# Patient Record
Sex: FEMALE | Race: WHITE | Employment: OTHER | URBAN - METROPOLITAN AREA
[De-identification: names, ages, dates, MRNs, and addresses within clinical notes are randomized per-mention and may not be internally consistent; named-entity substitution may affect disease eponyms.]

---

## 2017-12-01 ENCOUNTER — HOSPITAL ENCOUNTER (OUTPATIENT)
Dept: MAMMOGRAPHY | Age: 67
Discharge: HOME | End: 2017-12-01
Attending: INTERNAL MEDICINE
Payer: MEDICARE

## 2017-12-01 ENCOUNTER — HOSPITAL ENCOUNTER (OUTPATIENT)
Dept: BONE DENSITY | Age: 67
Discharge: HOME | End: 2017-12-01
Attending: INTERNAL MEDICINE
Payer: MEDICARE

## 2017-12-01 DIAGNOSIS — M85.9 DISORDER OF BONE DENSITY AND STRUCTURE, UNSPECIFIED: ICD-10-CM

## 2017-12-01 DIAGNOSIS — Z12.31 VISIT FOR SCREENING MAMMOGRAM: ICD-10-CM

## 2017-12-01 PROCEDURE — 77063 BREAST TOMOSYNTHESIS BI: CPT

## 2017-12-01 PROCEDURE — 77080 DXA BONE DENSITY AXIAL: CPT

## 2020-11-02 ENCOUNTER — HOSPITAL ENCOUNTER (OUTPATIENT)
Dept: MAMMOGRAPHY | Age: 70
Discharge: HOME | End: 2020-11-02
Attending: INTERNAL MEDICINE
Payer: MEDICARE

## 2020-11-02 DIAGNOSIS — Z12.31 VISIT FOR SCREENING MAMMOGRAM: ICD-10-CM

## 2020-11-02 PROCEDURE — 77063 BREAST TOMOSYNTHESIS BI: CPT

## 2023-08-18 ENCOUNTER — OFFICE VISIT (OUTPATIENT)
Age: 73
End: 2023-08-18

## 2023-08-18 DIAGNOSIS — M25.512 CHRONIC LEFT SHOULDER PAIN: ICD-10-CM

## 2023-08-18 DIAGNOSIS — S42.242P: Primary | ICD-10-CM

## 2023-08-18 DIAGNOSIS — G89.29 CHRONIC LEFT SHOULDER PAIN: ICD-10-CM

## 2023-08-18 NOTE — PROGRESS NOTES
tablet by mouth daily      diphenhydrAMINE (BENADRYL) 25 MG capsule Take 25 mg by mouth      ergocalciferol (ERGOCALCIFEROL) 1.25 MG (48084 UT) capsule Take 50,000 Units by mouth      ibuprofen (ADVIL;MOTRIN) 800 MG tablet Take 800 mg by mouth 3 times daily as needed      levothyroxine (SYNTHROID) 100 MCG tablet Take 100 mcg by mouth every other day      levothyroxine (SYNTHROID) 112 MCG tablet Take 112 mcg by mouth every other day      omeprazole (PRILOSEC) 20 MG delayed release capsule Take 20 mg by mouth daily      oxyCODONE-acetaminophen (PERCOCET) 5-325 MG per tablet 1-2 tablets every 4-6 hours prn pain      sertraline (ZOLOFT) 50 MG tablet Take 25 mg by mouth      simvastatin (ZOCOR) 20 MG tablet Take 20 mg by mouth       No current facility-administered medications for this visit. Allergies   Allergen Reactions    Cefprozil Hives    Iodinated Contrast Media Itching     Patient not sure if has allergy, patient stated started itching past a CT scan with dye    Shellfish Allergy Itching     Itching past eating shrimp, but eats shrimp sometimes. Past Surgical History:   Procedure Laterality Date    APPENDECTOMY         Social History     Socioeconomic History    Marital status: Legally      Spouse name: Not on file    Number of children: Not on file    Years of education: Not on file    Highest education level: Not on file   Occupational History    Not on file   Tobacco Use    Smoking status: Never    Smokeless tobacco: Not on file   Substance and Sexual Activity    Alcohol use:  Yes     Alcohol/week: 2.5 standard drinks    Drug use: Not on file    Sexual activity: Not on file   Other Topics Concern    Not on file   Social History Narrative    Not on file     Social Determinants of Health     Financial Resource Strain: Not on file   Food Insecurity: Not on file   Transportation Needs: Not on file   Physical Activity: Not on file   Stress: Not on file   Social Connections: Not on file

## 2023-08-31 ENCOUNTER — HOSPITAL ENCOUNTER (OUTPATIENT)
Facility: HOSPITAL | Age: 73
Discharge: HOME | End: 2023-08-31
Attending: ORTHOPAEDIC SURGERY
Payer: MEDICARE

## 2023-08-31 DIAGNOSIS — S42.242P: ICD-10-CM

## 2023-08-31 PROCEDURE — 73200 CT UPPER EXTREMITY W/O DYE: CPT

## 2023-09-26 ENCOUNTER — OFFICE VISIT (OUTPATIENT)
Age: 73
End: 2023-09-26
Payer: MEDICARE

## 2023-09-26 VITALS — WEIGHT: 207.6 LBS | TEMPERATURE: 97.6 F | HEIGHT: 67 IN | BODY MASS INDEX: 32.58 KG/M2

## 2023-09-26 DIAGNOSIS — S42.242P: Primary | ICD-10-CM

## 2023-09-26 PROBLEM — M25.512 PAIN IN JOINT OF LEFT SHOULDER: Status: ACTIVE | Noted: 2023-04-27

## 2023-09-26 PROBLEM — S42.202A CLOSED FRACTURE OF PROXIMAL END OF LEFT HUMERUS: Status: ACTIVE | Noted: 2022-03-18

## 2023-09-26 PROCEDURE — 1123F ACP DISCUSS/DSCN MKR DOCD: CPT | Performed by: ORTHOPAEDIC SURGERY

## 2023-09-26 PROCEDURE — 99214 OFFICE O/P EST MOD 30 MIN: CPT | Performed by: ORTHOPAEDIC SURGERY

## 2023-09-26 PROCEDURE — 1036F TOBACCO NON-USER: CPT | Performed by: ORTHOPAEDIC SURGERY

## 2023-09-26 PROCEDURE — G8427 DOCREV CUR MEDS BY ELIG CLIN: HCPCS | Performed by: ORTHOPAEDIC SURGERY

## 2023-09-26 PROCEDURE — 1090F PRES/ABSN URINE INCON ASSESS: CPT | Performed by: ORTHOPAEDIC SURGERY

## 2023-09-26 PROCEDURE — G8417 CALC BMI ABV UP PARAM F/U: HCPCS | Performed by: ORTHOPAEDIC SURGERY

## 2023-09-26 PROCEDURE — 3017F COLORECTAL CA SCREEN DOC REV: CPT | Performed by: ORTHOPAEDIC SURGERY

## 2023-09-26 PROCEDURE — G8399 PT W/DXA RESULTS DOCUMENT: HCPCS | Performed by: ORTHOPAEDIC SURGERY

## 2023-09-26 NOTE — PROGRESS NOTES
malunited four-part proximal humerus fracture. There appears to be healing between the greater tuberosity and shaft of the humerus. 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 As I discussed with her at her last visit I think the only option for this reasonably is a reverse shoulder arthroplasty to help her pain and function. She would like to move forward with surgery. We discussed the surgery as well as recovery at length and all of her questions were answered. Her risk factors for surgery include previous left shoulder fracture. Prescription medication management discussed with patient. Plan was discussed in detail with patient, all questions were answered, and patient voiced understanding of plan. Electronically signed by: Hank Ivy MD     Note: This note was completed using voice recognition software.   Any typographical/name errors or mistakes are unintentional.

## 2023-10-30 DIAGNOSIS — S42.242P: Primary | ICD-10-CM

## 2023-10-30 DIAGNOSIS — Z01.811 PRE-OP CHEST EXAM: ICD-10-CM

## 2023-10-30 DIAGNOSIS — Z01.818 PREOPERATIVE TESTING: ICD-10-CM

## 2023-10-30 DIAGNOSIS — Z01.810 PREOP CARDIOVASCULAR EXAM: ICD-10-CM

## 2024-06-21 ENCOUNTER — OFFICE VISIT (OUTPATIENT)
Age: 74
End: 2024-06-21

## 2024-06-21 VITALS — RESPIRATION RATE: 16 BRPM | BODY MASS INDEX: 32.51 KG/M2 | HEIGHT: 67 IN

## 2024-06-21 DIAGNOSIS — S42.242P: Primary | ICD-10-CM

## 2024-06-21 NOTE — PROGRESS NOTES
[facility] AMBULATORY OFFICE NOTE    Patient: Deirdre Perez                MRN: [id number]       SSN: [id number]  YOB: 1950        AGE: 73 y.o.        SEX: female  Body mass index is 32.51 kg/m².    PCP: Franco Pugh MD  06/21/24    Chief Complaint: Left shoulder follow-up    HPI: Deirdre Perez is a 73 y.o. female patient who returns today for her left shoulder.  Continues to have 10 pain.  No injury.  She has now been cleared by her primary care doctor to have surgery on her left shoulder.  Previously due to low iron she was not cleared.    Past Medical History:   Diagnosis Date    Acid reflux     Asthma     GERD (gastroesophageal reflux disease)     Hypertension     Hypothyroid     Thyroid disease        Family History   Problem Relation Age of Onset    Hypertension Daughter     Diabetes Sister     Heart Disease Sister     Hypertension Son     Colon Cancer Father     Prostate Cancer Father     Stroke Father     Diabetes Father     Hypertension Mother     Heart Disease Mother     Diabetes Mother     Osteoporosis Mother     Hypertension Father     Heart Disease Father        Current Outpatient Medications   Medication Sig Dispense Refill    amLODIPine-benazepril (LOTREL) 10-20 MG per capsule Take 1 capsule by mouth      bisoprolol-hydroCHLOROthiazide (ZIAC) 10-6.25 MG per tablet Take 1 tablet by mouth daily      carbonyl iron (FEOSOL) 45 MG TABS Take 1 tablet by mouth daily      diphenhydrAMINE (BENADRYL) 25 MG capsule Take 25 mg by mouth      ergocalciferol (ERGOCALCIFEROL) 1.25 MG (42945 UT) capsule Take 50,000 Units by mouth      levothyroxine (SYNTHROID) 100 MCG tablet Take 100 mcg by mouth every other day      levothyroxine (SYNTHROID) 112 MCG tablet Take 112 mcg by mouth every other day      omeprazole (PRILOSEC) 20 MG delayed release capsule Take 20 mg by mouth daily      oxyCODONE-acetaminophen (PERCOCET) 5-325 MG per tablet 1-2 tablets every 4-6 hours

## 2024-07-01 ENCOUNTER — HOSPITAL ENCOUNTER (OUTPATIENT)
Facility: HOSPITAL | Age: 74
Discharge: HOME | End: 2024-07-04
Attending: ORTHOPAEDIC SURGERY
Payer: MEDICARE

## 2024-07-01 DIAGNOSIS — S42.242P: ICD-10-CM

## 2024-07-01 PROCEDURE — 73200 CT UPPER EXTREMITY W/O DYE: CPT

## 2024-07-19 ENCOUNTER — OFFICE VISIT (OUTPATIENT)
Age: 74
End: 2024-07-19
Payer: MEDICARE

## 2024-07-19 VITALS — WEIGHT: 208 LBS | HEIGHT: 67 IN | BODY MASS INDEX: 32.65 KG/M2

## 2024-07-19 DIAGNOSIS — S42.242P: Primary | ICD-10-CM

## 2024-07-19 PROCEDURE — 3017F COLORECTAL CA SCREEN DOC REV: CPT | Performed by: ORTHOPAEDIC SURGERY

## 2024-07-19 PROCEDURE — 1123F ACP DISCUSS/DSCN MKR DOCD: CPT | Performed by: ORTHOPAEDIC SURGERY

## 2024-07-19 PROCEDURE — G8428 CUR MEDS NOT DOCUMENT: HCPCS | Performed by: ORTHOPAEDIC SURGERY

## 2024-07-19 PROCEDURE — 1036F TOBACCO NON-USER: CPT | Performed by: ORTHOPAEDIC SURGERY

## 2024-07-19 PROCEDURE — 1090F PRES/ABSN URINE INCON ASSESS: CPT | Performed by: ORTHOPAEDIC SURGERY

## 2024-07-19 PROCEDURE — G8399 PT W/DXA RESULTS DOCUMENT: HCPCS | Performed by: ORTHOPAEDIC SURGERY

## 2024-07-19 PROCEDURE — G8417 CALC BMI ABV UP PARAM F/U: HCPCS | Performed by: ORTHOPAEDIC SURGERY

## 2024-07-19 PROCEDURE — 99214 OFFICE O/P EST MOD 30 MIN: CPT | Performed by: ORTHOPAEDIC SURGERY

## 2024-07-19 NOTE — PROGRESS NOTES
[facility] AMBULATORY OFFICE NOTE    Patient: Deirdre Perez                MRN: [id number]       SSN: [id number]  YOB: 1950        AGE: 73 y.o.        SEX: female  Body mass index is 32.58 kg/m².    PCP: Franco Pugh MD  07/19/24    Chief Complaint: Left shoulder CT follow up    HPI: Deirdre Perez is a 73 y.o. female patient who returns today for her left shoulder.  No change in pain.    Past Medical History:   Diagnosis Date    Acid reflux     Asthma     GERD (gastroesophageal reflux disease)     Hypertension     Hypothyroid     Thyroid disease        Family History   Problem Relation Age of Onset    Hypertension Daughter     Diabetes Sister     Heart Disease Sister     Hypertension Son     Colon Cancer Father     Prostate Cancer Father     Stroke Father     Diabetes Father     Hypertension Mother     Heart Disease Mother     Diabetes Mother     Osteoporosis Mother     Hypertension Father     Heart Disease Father        Current Outpatient Medications   Medication Sig Dispense Refill    amLODIPine-benazepril (LOTREL) 10-20 MG per capsule Take 1 capsule by mouth      bisoprolol-hydroCHLOROthiazide (ZIAC) 10-6.25 MG per tablet Take 1 tablet by mouth daily      carbonyl iron (FEOSOL) 45 MG TABS Take 1 tablet by mouth daily      diphenhydrAMINE (BENADRYL) 25 MG capsule Take 25 mg by mouth      ergocalciferol (ERGOCALCIFEROL) 1.25 MG (49716 UT) capsule Take 50,000 Units by mouth      levothyroxine (SYNTHROID) 100 MCG tablet Take 100 mcg by mouth every other day      levothyroxine (SYNTHROID) 112 MCG tablet Take 112 mcg by mouth every other day      omeprazole (PRILOSEC) 20 MG delayed release capsule Take 20 mg by mouth daily      oxyCODONE-acetaminophen (PERCOCET) 5-325 MG per tablet 1-2 tablets every 4-6 hours prn pain      sertraline (ZOLOFT) 50 MG tablet Take 25 mg by mouth      simvastatin (ZOCOR) 20 MG tablet Take 20 mg by mouth       No current

## 2024-07-24 DIAGNOSIS — D68.9 COAGULOPATHY (HCC): ICD-10-CM

## 2024-07-24 DIAGNOSIS — Z01.818 PREOPERATIVE TESTING: ICD-10-CM

## 2024-07-24 DIAGNOSIS — Z01.811 PRE-OP CHEST EXAM: ICD-10-CM

## 2024-07-24 DIAGNOSIS — Z01.810 PREOP CARDIOVASCULAR EXAM: ICD-10-CM

## 2024-07-24 DIAGNOSIS — E11.9 TYPE 2 DIABETES MELLITUS WITHOUT COMPLICATION, UNSPECIFIED WHETHER LONG TERM INSULIN USE (HCC): ICD-10-CM

## 2024-07-24 DIAGNOSIS — S42.242P: Primary | ICD-10-CM

## 2024-07-29 ENCOUNTER — PREP FOR PROCEDURE (OUTPATIENT)
Age: 74
End: 2024-07-29

## 2024-07-29 DIAGNOSIS — S42.242P: ICD-10-CM

## 2024-08-01 ENCOUNTER — HOSPITAL ENCOUNTER (OUTPATIENT)
Facility: HOSPITAL | Age: 74
End: 2024-08-01
Payer: MEDICARE

## 2024-08-01 ENCOUNTER — HOSPITAL ENCOUNTER (OUTPATIENT)
Facility: HOSPITAL | Age: 74
Discharge: HOME | End: 2024-08-01
Payer: MEDICARE

## 2024-08-01 DIAGNOSIS — S42.242P: ICD-10-CM

## 2024-08-01 DIAGNOSIS — Z01.810 PREOP CARDIOVASCULAR EXAM: ICD-10-CM

## 2024-08-01 DIAGNOSIS — Z01.811 PRE-OP CHEST EXAM: ICD-10-CM

## 2024-08-01 DIAGNOSIS — D68.9 COAGULOPATHY (HCC): ICD-10-CM

## 2024-08-01 DIAGNOSIS — Z01.818 PREOPERATIVE TESTING: ICD-10-CM

## 2024-08-01 DIAGNOSIS — E11.9 TYPE 2 DIABETES MELLITUS WITHOUT COMPLICATION, UNSPECIFIED WHETHER LONG TERM INSULIN USE (HCC): ICD-10-CM

## 2024-08-01 LAB
ALBUMIN SERPL-MCNC: 3.8 G/DL (ref 3.4–5)
ALBUMIN/GLOB SERPL: 1.1 (ref 0.8–1.7)
ALP SERPL-CCNC: 91 U/L (ref 45–117)
ALT SERPL-CCNC: 22 U/L (ref 13–56)
ANION GAP SERPL CALC-SCNC: 6 MMOL/L (ref 3–18)
APPEARANCE UR: CLEAR
APTT PPP: 28.1 SEC (ref 23–36.4)
AST SERPL-CCNC: 15 U/L (ref 10–38)
BACTERIA URNS QL MICRO: ABNORMAL /HPF
BASOPHILS # BLD: 0 K/UL (ref 0–0.1)
BASOPHILS NFR BLD: 0 % (ref 0–2)
BILIRUB SERPL-MCNC: 0.4 MG/DL (ref 0.2–1)
BILIRUB UR QL: NEGATIVE
BUN SERPL-MCNC: 11 MG/DL (ref 7–18)
BUN/CREAT SERPL: 13 (ref 12–20)
CALCIUM SERPL-MCNC: 8.8 MG/DL (ref 8.5–10.1)
CHLORIDE SERPL-SCNC: 106 MMOL/L (ref 100–111)
CO2 SERPL-SCNC: 27 MMOL/L (ref 21–32)
COLOR UR: ABNORMAL
CREAT SERPL-MCNC: 0.83 MG/DL (ref 0.6–1.3)
DIFFERENTIAL METHOD BLD: ABNORMAL
EOSINOPHIL # BLD: 0.1 K/UL (ref 0–0.4)
EOSINOPHIL NFR BLD: 2 % (ref 0–5)
EPITH CASTS URNS QL MICRO: ABNORMAL /LPF (ref 0–5)
ERYTHROCYTE [DISTWIDTH] IN BLOOD BY AUTOMATED COUNT: 15.8 % (ref 11.6–14.5)
EST. AVERAGE GLUCOSE BLD GHB EST-MCNC: 134 MG/DL
GLOBULIN SER CALC-MCNC: 3.4 G/DL (ref 2–4)
GLUCOSE SERPL-MCNC: 112 MG/DL (ref 74–99)
GLUCOSE UR STRIP.AUTO-MCNC: NEGATIVE MG/DL
HBA1C MFR BLD: 6.3 % (ref 4.2–5.6)
HCT VFR BLD AUTO: 36.2 % (ref 35–45)
HGB BLD-MCNC: 11.8 G/DL (ref 12–16)
HGB UR QL STRIP: NEGATIVE
IMM GRANULOCYTES # BLD AUTO: 0 K/UL (ref 0–0.04)
IMM GRANULOCYTES NFR BLD AUTO: 0 % (ref 0–0.5)
INR PPP: 1 (ref 0.9–1.1)
KETONES UR QL STRIP.AUTO: ABNORMAL MG/DL
LEUKOCYTE ESTERASE UR QL STRIP.AUTO: NEGATIVE
LYMPHOCYTES # BLD: 1.6 K/UL (ref 0.9–3.6)
LYMPHOCYTES NFR BLD: 35 % (ref 21–52)
MCH RBC QN AUTO: 28.7 PG (ref 24–34)
MCHC RBC AUTO-ENTMCNC: 32.6 G/DL (ref 31–37)
MCV RBC AUTO: 88.1 FL (ref 78–100)
MONOCYTES # BLD: 0.4 K/UL (ref 0.05–1.2)
MONOCYTES NFR BLD: 8 % (ref 3–10)
MUCOUS THREADS URNS QL MICRO: ABNORMAL /LPF
NEUTS SEG # BLD: 2.5 K/UL (ref 1.8–8)
NEUTS SEG NFR BLD: 55 % (ref 40–73)
NITRITE UR QL STRIP.AUTO: NEGATIVE
NRBC # BLD: 0 K/UL (ref 0–0.01)
NRBC BLD-RTO: 0 PER 100 WBC
PH UR STRIP: 6.5 (ref 5–8)
PLATELET # BLD AUTO: 219 K/UL (ref 135–420)
PMV BLD AUTO: 9.9 FL (ref 9.2–11.8)
POTASSIUM SERPL-SCNC: 3.8 MMOL/L (ref 3.5–5.5)
PROT SERPL-MCNC: 7.2 G/DL (ref 6.4–8.2)
PROT UR STRIP-MCNC: ABNORMAL MG/DL
PROTHROMBIN TIME: 13.3 SEC (ref 11.9–14.9)
RBC # BLD AUTO: 4.11 M/UL (ref 4.2–5.3)
RBC #/AREA URNS HPF: ABNORMAL /HPF (ref 0–5)
SODIUM SERPL-SCNC: 139 MMOL/L (ref 136–145)
SP GR UR REFRACTOMETRY: 1.02 (ref 1–1.03)
UROBILINOGEN UR QL STRIP.AUTO: 1 EU/DL (ref 0.2–1)
WBC # BLD AUTO: 4.5 K/UL (ref 4.6–13.2)
WBC URNS QL MICRO: ABNORMAL /HPF (ref 0–4)

## 2024-08-01 PROCEDURE — 93005 ELECTROCARDIOGRAM TRACING: CPT

## 2024-08-01 PROCEDURE — 85610 PROTHROMBIN TIME: CPT

## 2024-08-01 PROCEDURE — 85730 THROMBOPLASTIN TIME PARTIAL: CPT

## 2024-08-01 PROCEDURE — 81001 URINALYSIS AUTO W/SCOPE: CPT

## 2024-08-01 PROCEDURE — 36415 COLL VENOUS BLD VENIPUNCTURE: CPT

## 2024-08-01 PROCEDURE — 83036 HEMOGLOBIN GLYCOSYLATED A1C: CPT

## 2024-08-01 PROCEDURE — 80053 COMPREHEN METABOLIC PANEL: CPT

## 2024-08-01 PROCEDURE — 85025 COMPLETE CBC W/AUTO DIFF WBC: CPT

## 2024-08-01 PROCEDURE — 71046 X-RAY EXAM CHEST 2 VIEWS: CPT

## 2024-08-02 LAB
EKG ATRIAL RATE: 73 BPM
EKG DIAGNOSIS: NORMAL
EKG P AXIS: 23 DEGREES
EKG P-R INTERVAL: 166 MS
EKG Q-T INTERVAL: 402 MS
EKG QRS DURATION: 94 MS
EKG QTC CALCULATION (BAZETT): 442 MS
EKG R AXIS: 15 DEGREES
EKG T AXIS: 7 DEGREES
EKG VENTRICULAR RATE: 73 BPM

## 2024-08-02 PROCEDURE — 93010 ELECTROCARDIOGRAM REPORT: CPT | Performed by: INTERNAL MEDICINE

## 2024-08-16 NOTE — PERIOP NOTE
Instructions for your surgery at [facility]      Today's Date:  8/16/2024      Patient's Name:  Deirdre Perez           Surgery Date:  8/26              Please enter the main entrance of the hospital and check-in at the front security desk located in the lobby. They will direct you to the area to report for your surgery.     Do NOT eat or drink anything, including candy, gum, or ice chips after midnight prior to your surgery, unless you have specific instructions from your surgeon or anesthesia provider to do so.  Brush your teeth before coming to the hospital. You may swish with water, but do not swallow.  No smoking/Vaping/E-Cigarettes 24 hours prior to the day of surgery.  No alcohol 24 hours prior to the day of surgery.  No recreational drugs for one week prior to the day of surgery.  Bring Photo ID, Insurance information, and Co-pay if required on day of surgery.  Bring in pertinent legal documents, such as, Medical Power of , DNR, Advance Directive, etc.  Leave all valuables, including money/purse, at home.  Remove all jewelry, including ALL body piercings, nail polish, acrylic nails, and makeup (including mascara); no lotions, powders, deodorant, or perfume/cologne/after shave on the skin.  Follow instruction for Hibiclens washes and CHG wipes from surgeon's office.   Glasses and dentures may be worn to the hospital. They must be removed prior to surgery. Please bring case/container for glasses or dentures.   Contact lenses should not be worn on day of surgery.   Call your doctor's office if symptoms of a cold or illness develop within 24-48 hours prior to your surgery.  Call your doctor's office if you have any questions concerning insurance or co-pays.  15. AN ADULT (relative or friend 18 years or older) MUST DRIVE YOU HOME AFTER YOUR SURGERY.  16. Please make arrangements for a responsible adult (18 years or older) to be with you for 24 hours after your surgery.   17.

## 2024-08-22 RX ORDER — SODIUM CHLORIDE 0.9 % (FLUSH) 0.9 %
5-40 SYRINGE (ML) INJECTION PRN
Status: CANCELLED | OUTPATIENT
Start: 2024-08-22

## 2024-08-22 RX ORDER — SODIUM CHLORIDE 0.9 % (FLUSH) 0.9 %
5-40 SYRINGE (ML) INJECTION EVERY 12 HOURS SCHEDULED
Status: CANCELLED | OUTPATIENT
Start: 2024-08-22

## 2024-08-22 RX ORDER — ACETAMINOPHEN 325 MG/1
1000 TABLET ORAL ONCE
Status: CANCELLED | OUTPATIENT
Start: 2024-08-22 | End: 2024-08-22

## 2024-08-22 RX ORDER — CELECOXIB 100 MG/1
100 CAPSULE ORAL ONCE
Status: CANCELLED | OUTPATIENT
Start: 2024-08-22 | End: 2024-08-22

## 2024-08-22 RX ORDER — SODIUM CHLORIDE 9 MG/ML
INJECTION, SOLUTION INTRAVENOUS PRN
Status: CANCELLED | OUTPATIENT
Start: 2024-08-22

## 2024-08-23 ENCOUNTER — ANESTHESIA EVENT (OUTPATIENT)
Facility: HOSPITAL | Age: 74
End: 2024-08-23
Payer: MEDICARE

## 2024-08-23 ENCOUNTER — TELEPHONE (OUTPATIENT)
Facility: HOSPITAL | Age: 74
End: 2024-08-23

## 2024-08-23 ENCOUNTER — OFFICE VISIT (OUTPATIENT)
Age: 74
End: 2024-08-23

## 2024-08-23 VITALS
WEIGHT: 201.6 LBS | SYSTOLIC BLOOD PRESSURE: 149 MMHG | BODY MASS INDEX: 34.42 KG/M2 | DIASTOLIC BLOOD PRESSURE: 71 MMHG | HEIGHT: 64 IN

## 2024-08-23 DIAGNOSIS — S42.242P: Primary | ICD-10-CM

## 2024-08-23 PROCEDURE — 99024 POSTOP FOLLOW-UP VISIT: CPT | Performed by: ORTHOPAEDIC SURGERY

## 2024-08-23 RX ORDER — OXYCODONE HYDROCHLORIDE AND ACETAMINOPHEN 5; 325 MG/1; MG/1
1-2 TABLET ORAL EVERY 4 HOURS PRN
Qty: 32 TABLET | Refills: 0 | Status: SHIPPED | OUTPATIENT
Start: 2024-08-23 | End: 2024-08-30

## 2024-08-23 NOTE — PROGRESS NOTES
and external rotation with crepitus.    IMAGING:  Imaging read by myself and interpreted as follows:      ASSESSMENT & PLAN  Diagnosis: Left shoulder malunited proximal humerus fracture    Deirdre Perez has a malunited left proximal humerus fracture with collapse.  Plan is for a reverse shoulder replacement on Monday.  All of her questions were answered.  The surgery was discussed in detail.  Pain medication prescribed.  Follow-up postoperatively.    Prescription medication management discussed.     Plan was discussed in detail with patient, all questions were answered, and patient voiced understanding of plan.    Electronically signed by: Alverto Qureshi MD    Note: This note was completed using voice recognition software.  Any typographical/name errors or mistakes are unintentional.

## 2024-08-23 NOTE — TELEPHONE ENCOUNTER
Call placed to patient, ID verified x 2. Patient  has decided with their surgeon to have a total shoulder  replacement to decrease  pain and improve mobility . Topics discussed included surgery preparation, what to expect the day of surgery, medications,  and occupational therapy, and discharge planning.  It was discussed that this is considered an elective surgery and that prior to the surgery  decisions such as arranging for help at home once they are discharged needs to be made.Patient agreed to get home ready for surgery and to have a ride arranged to go home.She identifies her son as her support system and anticipates spending her first night in the hospital. She will be staying with her son after surgery who lives in a one story home with no steps to enter. She is picking up her postoperative medications today.  Instructions were given for CHG bathing. Patient states that she will shower with dial soap.  Patient will complete the procedure the morning of surgery.  Patient was reminded not to apply any deoderant, perfumes, makeup or lotions to skin the morning of surgery. She will remain NPO after midnight and  will take only the medications as instructed to take by her surgeon the morning of surgery with a sip of water. Patient states that she was instructed to take her bisoprolol and her omeprazole the morning of surgery. In addition, she was instructed that she may take her levothyroxine with just a sip of water. A total shoulder replacement   education book will be provided to patient post op prior to discharge. Education regarding the importance of early and frequent ambulation to avoid surgical complications  was provided. Recommended the use of ice to assist with pain and swelling post op for 20 minutes an hour, not to be placed directly on her skin. Reviewed proper sling wear as well as postoperative showering instructions. Patient verbalized understanding of all information provided.  Opportunity was

## 2024-08-23 NOTE — H&P (VIEW-ONLY)
[facility] AMBULATORY OFFICE  HISTORY & PHYSICAL EXAM    Patient: Deirdre Perez                MRN: [id number]       SSN: [id number]  YOB: 1950        AGE: 74 y.o.        SEX: female  Body mass index is 34.6 kg/m².    PCP: Franco Pugh MD  08/23/24    CHIEF COMPLAINT: Left shoulder preop appointment    HPI: Deirdre Perez is a 74 y.o. female patient who is here today for her left shoulder preop for upcoming left reverse shoulder replacement.  No changes since her last visit.    Past Medical History:   Diagnosis Date    Acid reflux     Asthma     GERD (gastroesophageal reflux disease)     Hypertension     Hypothyroid     Thyroid disease        Family History   Problem Relation Age of Onset    Hypertension Daughter     Diabetes Sister     Heart Disease Sister     Hypertension Son     Colon Cancer Father     Prostate Cancer Father     Stroke Father     Diabetes Father     Hypertension Mother     Heart Disease Mother     Diabetes Mother     Osteoporosis Mother     Hypertension Father     Heart Disease Father        Current Outpatient Medications   Medication Sig Dispense Refill    amLODIPine-benazepril (LOTREL) 10-20 MG per capsule Take 1 capsule by mouth      bisoprolol-hydroCHLOROthiazide (ZIAC) 10-6.25 MG per tablet Take 1 tablet by mouth daily      carbonyl iron (FEOSOL) 45 MG TABS Take 1 tablet by mouth daily (Patient not taking: Reported on 8/16/2024)      diphenhydrAMINE (BENADRYL) 25 MG capsule Take 25 mg by mouth      ergocalciferol (ERGOCALCIFEROL) 1.25 MG (10910 UT) capsule Take 50,000 Units by mouth      levothyroxine (SYNTHROID) 100 MCG tablet Take 100 mcg by mouth every other day      levothyroxine (SYNTHROID) 112 MCG tablet Take 112 mcg by mouth every other day      omeprazole (PRILOSEC) 20 MG delayed release capsule Take 20 mg by mouth daily      oxyCODONE-acetaminophen (PERCOCET) 5-325 MG per tablet 1-2 tablets every 4-6 hours prn pain       sertraline (ZOLOFT) 50 MG tablet Take 25 mg by mouth      simvastatin (ZOCOR) 20 MG tablet Take 20 mg by mouth       No current facility-administered medications for this visit.       Allergies   Allergen Reactions    Cefprozil Hives    Iodinated Contrast Media Itching     Patient not sure if has allergy, patient stated started itching past a CT scan with dye    Shellfish Allergy Itching     Itching past eating shrimp, but eats shrimp sometimes.       Past Surgical History:   Procedure Laterality Date    APPENDECTOMY         Social History     Socioeconomic History    Marital status:      Spouse name: Not on file    Number of children: Not on file    Years of education: Not on file    Highest education level: Not on file   Occupational History    Not on file   Tobacco Use    Smoking status: Never    Smokeless tobacco: Not on file   Substance and Sexual Activity    Alcohol use: Yes     Alcohol/week: 2.5 standard drinks of alcohol    Drug use: Not on file    Sexual activity: Not on file   Other Topics Concern    Not on file   Social History Narrative    Not on file     Social Determinants of Health     Financial Resource Strain: Not on file   Food Insecurity: Not on file   Transportation Needs: Not on file   Physical Activity: Not on file   Stress: Not on file   Social Connections: Not on file   Intimate Partner Violence: Not on file   Housing Stability: Not on file       REVIEW OF SYSTEMS:    14 point review of systems on the intake form is negative except as noted in the HPI    PHYSICAL EXAMINATION:  BP (!) 149/71 (Site: Right Upper Arm, Position: Sitting, Cuff Size: Large Adult)   Ht 1.626 m (5' 4\")   Wt 91.4 kg (201 lb 9.6 oz)   LMP  (LMP Unknown)   BMI 34.60 kg/m²   Body mass index is 34.6 kg/m².    GENERAL: Alert and oriented x3, in no acute distress, well-developed, well-nourished.  HEENT: Normocephalic, atraumatic.    Left shoulder continues to have decreased range of motion with forward flexion

## 2024-08-26 ENCOUNTER — ANESTHESIA (OUTPATIENT)
Facility: HOSPITAL | Age: 74
End: 2024-08-26
Payer: MEDICARE

## 2024-08-26 ENCOUNTER — APPOINTMENT (OUTPATIENT)
Facility: HOSPITAL | Age: 74
End: 2024-08-26
Attending: ORTHOPAEDIC SURGERY
Payer: MEDICARE

## 2024-08-26 ENCOUNTER — HOSPITAL ENCOUNTER (OUTPATIENT)
Facility: HOSPITAL | Age: 74
Setting detail: OBSERVATION
Discharge: HOME | End: 2024-08-27
Attending: ORTHOPAEDIC SURGERY | Admitting: ORTHOPAEDIC SURGERY
Payer: MEDICARE

## 2024-08-26 PROBLEM — Z96.612 S/P SHOULDER REPLACEMENT, LEFT: Status: ACTIVE | Noted: 2024-08-26

## 2024-08-26 PROCEDURE — 6370000000 HC RX 637 (ALT 250 FOR IP): Performed by: ORTHOPAEDIC SURGERY

## 2024-08-26 PROCEDURE — 6370000000 HC RX 637 (ALT 250 FOR IP): Performed by: NURSE ANESTHETIST, CERTIFIED REGISTERED

## 2024-08-26 PROCEDURE — 6360000002 HC RX W HCPCS: Performed by: ORTHOPAEDIC SURGERY

## 2024-08-26 PROCEDURE — 2700000000 HC OXYGEN THERAPY PER DAY

## 2024-08-26 PROCEDURE — 94761 N-INVAS EAR/PLS OXIMETRY MLT: CPT

## 2024-08-26 PROCEDURE — 2720000010 HC SURG SUPPLY STERILE: Performed by: ORTHOPAEDIC SURGERY

## 2024-08-26 PROCEDURE — 73020 X-RAY EXAM OF SHOULDER: CPT

## 2024-08-26 PROCEDURE — 6360000002 HC RX W HCPCS: Performed by: NURSE ANESTHETIST, CERTIFIED REGISTERED

## 2024-08-26 PROCEDURE — G0378 HOSPITAL OBSERVATION PER HR: HCPCS

## 2024-08-26 PROCEDURE — 3600000002 HC SURGERY LEVEL 2 BASE: Performed by: ORTHOPAEDIC SURGERY

## 2024-08-26 PROCEDURE — C1713 ANCHOR/SCREW BN/BN,TIS/BN: HCPCS | Performed by: ORTHOPAEDIC SURGERY

## 2024-08-26 PROCEDURE — 3600000012 HC SURGERY LEVEL 2 ADDTL 15MIN: Performed by: ORTHOPAEDIC SURGERY

## 2024-08-26 PROCEDURE — C1776 JOINT DEVICE (IMPLANTABLE): HCPCS | Performed by: ORTHOPAEDIC SURGERY

## 2024-08-26 PROCEDURE — 7100000000 HC PACU RECOVERY - FIRST 15 MIN: Performed by: ORTHOPAEDIC SURGERY

## 2024-08-26 PROCEDURE — 2500000003 HC RX 250 WO HCPCS: Performed by: NURSE ANESTHETIST, CERTIFIED REGISTERED

## 2024-08-26 PROCEDURE — 2580000003 HC RX 258: Performed by: ORTHOPAEDIC SURGERY

## 2024-08-26 PROCEDURE — 97116 GAIT TRAINING THERAPY: CPT

## 2024-08-26 PROCEDURE — 7100000001 HC PACU RECOVERY - ADDTL 15 MIN: Performed by: ORTHOPAEDIC SURGERY

## 2024-08-26 PROCEDURE — 3700000000 HC ANESTHESIA ATTENDED CARE: Performed by: ORTHOPAEDIC SURGERY

## 2024-08-26 PROCEDURE — 2580000003 HC RX 258: Performed by: NURSE ANESTHETIST, CERTIFIED REGISTERED

## 2024-08-26 PROCEDURE — A4217 STERILE WATER/SALINE, 500 ML: HCPCS | Performed by: ORTHOPAEDIC SURGERY

## 2024-08-26 PROCEDURE — 2709999900 HC NON-CHARGEABLE SUPPLY: Performed by: ORTHOPAEDIC SURGERY

## 2024-08-26 PROCEDURE — 23472 RECONSTRUCT SHOULDER JOINT: CPT | Performed by: ORTHOPAEDIC SURGERY

## 2024-08-26 PROCEDURE — 3700000001 HC ADD 15 MINUTES (ANESTHESIA): Performed by: ORTHOPAEDIC SURGERY

## 2024-08-26 PROCEDURE — 97161 PT EVAL LOW COMPLEX 20 MIN: CPT

## 2024-08-26 PROCEDURE — 64415 NJX AA&/STRD BRCH PLXS IMG: CPT | Performed by: STUDENT IN AN ORGANIZED HEALTH CARE EDUCATION/TRAINING PROGRAM

## 2024-08-26 DEVICE — IMPLANTABLE DEVICE: Type: IMPLANTABLE DEVICE | Site: SHOULDER | Status: FUNCTIONAL

## 2024-08-26 DEVICE — SCREW BONE L16MM DIA4.5MM PERIPH NONLOCKING FOR MOD GLEN SYS: Type: IMPLANTABLE DEVICE | Site: SHOULDER | Status: FUNCTIONAL

## 2024-08-26 DEVICE — SCREW BNE L28MM DIA5.5MM PERIPH LOK FOR MOD GLEN SYS: Type: IMPLANTABLE DEVICE | Site: SHOULDER | Status: FUNCTIONAL

## 2024-08-26 DEVICE — SCREW BNE L32MM DIA5.5MM PERIPH LOK FOR MOD GLEN SYS: Type: IMPLANTABLE DEVICE | Site: SHOULDER | Status: FUNCTIONAL

## 2024-08-26 DEVICE — POST MODULAR F/AUNMENTED MGS BASEPLATE: Type: IMPLANTABLE DEVICE | Site: SHOULDER | Status: FUNCTIONAL

## 2024-08-26 DEVICE — SCREW BNE L20MM DIA45MM PERIPH NONLOCKING FOR MOD GLEN SYS: Type: IMPLANTABLE DEVICE | Site: SHOULDER | Status: FUNCTIONAL

## 2024-08-26 DEVICE — STEM HUM SZ 5 SHLDR COAT CAP UNIVERS REVERS: Type: IMPLANTABLE DEVICE | Site: SHOULDER | Status: FUNCTIONAL

## 2024-08-26 DEVICE — SPHERE GLEN DIA33MM +4 BASEPLT 24MM SHLDR LAT TAPR MOD: Type: IMPLANTABLE DEVICE | Site: SHOULDER | Status: FUNCTIONAL

## 2024-08-26 RX ORDER — HYDROCHLOROTHIAZIDE 25 MG/1
6.25 TABLET ORAL DAILY
Status: DISCONTINUED | OUTPATIENT
Start: 2024-08-26 | End: 2024-08-27 | Stop reason: HOSPADM

## 2024-08-26 RX ORDER — SODIUM CHLORIDE 0.9 % (FLUSH) 0.9 %
5-40 SYRINGE (ML) INJECTION PRN
Status: DISCONTINUED | OUTPATIENT
Start: 2024-08-26 | End: 2024-08-27 | Stop reason: HOSPADM

## 2024-08-26 RX ORDER — METOPROLOL SUCCINATE 50 MG/1
100 TABLET, EXTENDED RELEASE ORAL DAILY
Status: DISCONTINUED | OUTPATIENT
Start: 2024-08-26 | End: 2024-08-27 | Stop reason: HOSPADM

## 2024-08-26 RX ORDER — SODIUM CHLORIDE 9 MG/ML
INJECTION, SOLUTION INTRAVENOUS PRN
Status: DISCONTINUED | OUTPATIENT
Start: 2024-08-26 | End: 2024-08-27 | Stop reason: HOSPADM

## 2024-08-26 RX ORDER — FENTANYL CITRATE 50 UG/ML
25 INJECTION, SOLUTION INTRAMUSCULAR; INTRAVENOUS EVERY 5 MIN PRN
Status: DISCONTINUED | OUTPATIENT
Start: 2024-08-26 | End: 2024-08-26 | Stop reason: HOSPADM

## 2024-08-26 RX ORDER — AMLODIPINE BESYLATE 10 MG/1
10 TABLET ORAL DAILY
Status: DISCONTINUED | OUTPATIENT
Start: 2024-08-26 | End: 2024-08-27 | Stop reason: HOSPADM

## 2024-08-26 RX ORDER — SODIUM CHLORIDE 0.9 % (FLUSH) 0.9 %
5-40 SYRINGE (ML) INJECTION PRN
Status: DISCONTINUED | OUTPATIENT
Start: 2024-08-26 | End: 2024-08-26 | Stop reason: HOSPADM

## 2024-08-26 RX ORDER — ONDANSETRON 2 MG/ML
INJECTION INTRAMUSCULAR; INTRAVENOUS PRN
Status: DISCONTINUED | OUTPATIENT
Start: 2024-08-26 | End: 2024-08-26 | Stop reason: SDUPTHER

## 2024-08-26 RX ORDER — ACETAMINOPHEN 325 MG/1
650 TABLET ORAL EVERY 6 HOURS
Status: DISCONTINUED | OUTPATIENT
Start: 2024-08-26 | End: 2024-08-27 | Stop reason: HOSPADM

## 2024-08-26 RX ORDER — ROCURONIUM BROMIDE 10 MG/ML
INJECTION, SOLUTION INTRAVENOUS PRN
Status: DISCONTINUED | OUTPATIENT
Start: 2024-08-26 | End: 2024-08-26 | Stop reason: SDUPTHER

## 2024-08-26 RX ORDER — FENTANYL CITRATE 50 UG/ML
100 INJECTION, SOLUTION INTRAMUSCULAR; INTRAVENOUS
Status: COMPLETED | OUTPATIENT
Start: 2024-08-26 | End: 2024-08-26

## 2024-08-26 RX ORDER — ONDANSETRON 4 MG/1
4 TABLET, ORALLY DISINTEGRATING ORAL EVERY 8 HOURS PRN
Status: DISCONTINUED | OUTPATIENT
Start: 2024-08-26 | End: 2024-08-27 | Stop reason: HOSPADM

## 2024-08-26 RX ORDER — OXYCODONE HYDROCHLORIDE 10 MG/1
10 TABLET ORAL EVERY 4 HOURS PRN
Status: DISCONTINUED | OUTPATIENT
Start: 2024-08-26 | End: 2024-08-27 | Stop reason: HOSPADM

## 2024-08-26 RX ORDER — ATORVASTATIN CALCIUM 10 MG/1
10 TABLET, FILM COATED ORAL DAILY
Status: DISCONTINUED | OUTPATIENT
Start: 2024-08-26 | End: 2024-08-26

## 2024-08-26 RX ORDER — LEVOTHYROXINE SODIUM 50 UG/1
100 TABLET ORAL EVERY OTHER DAY
Status: DISCONTINUED | OUTPATIENT
Start: 2024-08-28 | End: 2024-08-27 | Stop reason: HOSPADM

## 2024-08-26 RX ORDER — SODIUM CHLORIDE 0.9 % (FLUSH) 0.9 %
5-40 SYRINGE (ML) INJECTION EVERY 12 HOURS SCHEDULED
Status: DISCONTINUED | OUTPATIENT
Start: 2024-08-26 | End: 2024-08-26 | Stop reason: HOSPADM

## 2024-08-26 RX ORDER — KETOROLAC TROMETHAMINE 15 MG/ML
INJECTION, SOLUTION INTRAMUSCULAR; INTRAVENOUS PRN
Status: DISCONTINUED | OUTPATIENT
Start: 2024-08-26 | End: 2024-08-26 | Stop reason: SDUPTHER

## 2024-08-26 RX ORDER — PROCHLORPERAZINE EDISYLATE 5 MG/ML
5 INJECTION INTRAMUSCULAR; INTRAVENOUS
Status: DISCONTINUED | OUTPATIENT
Start: 2024-08-26 | End: 2024-08-26 | Stop reason: HOSPADM

## 2024-08-26 RX ORDER — ASPIRIN 325 MG
325 TABLET, DELAYED RELEASE (ENTERIC COATED) ORAL 2 TIMES DAILY
Status: DISCONTINUED | OUTPATIENT
Start: 2024-08-27 | End: 2024-08-27 | Stop reason: HOSPADM

## 2024-08-26 RX ORDER — FERROUS SULFATE 325(65) MG
325 TABLET ORAL
Status: DISCONTINUED | OUTPATIENT
Start: 2024-08-26 | End: 2024-08-27 | Stop reason: HOSPADM

## 2024-08-26 RX ORDER — SODIUM CHLORIDE, SODIUM LACTATE, POTASSIUM CHLORIDE, CALCIUM CHLORIDE 600; 310; 30; 20 MG/100ML; MG/100ML; MG/100ML; MG/100ML
INJECTION, SOLUTION INTRAVENOUS CONTINUOUS
Status: DISCONTINUED | OUTPATIENT
Start: 2024-08-26 | End: 2024-08-26 | Stop reason: HOSPADM

## 2024-08-26 RX ORDER — SODIUM CHLORIDE 9 MG/ML
INJECTION, SOLUTION INTRAVENOUS PRN
Status: DISCONTINUED | OUTPATIENT
Start: 2024-08-26 | End: 2024-08-26 | Stop reason: HOSPADM

## 2024-08-26 RX ORDER — BISOPROLOL FUMARATE AND HYDROCHLOROTHIAZIDE 10; 6.25 MG/1; MG/1
1 TABLET ORAL DAILY
Status: DISCONTINUED | OUTPATIENT
Start: 2024-08-26 | End: 2024-08-26

## 2024-08-26 RX ORDER — KETOROLAC TROMETHAMINE 15 MG/ML
15 INJECTION, SOLUTION INTRAMUSCULAR; INTRAVENOUS EVERY 6 HOURS
Status: DISCONTINUED | OUTPATIENT
Start: 2024-08-26 | End: 2024-08-26

## 2024-08-26 RX ORDER — AMLODIPINE AND BENAZEPRIL HYDROCHLORIDE 10; 20 MG/1; MG/1
1 CAPSULE ORAL DAILY
Status: DISCONTINUED | OUTPATIENT
Start: 2024-08-26 | End: 2024-08-26

## 2024-08-26 RX ORDER — CELECOXIB 100 MG/1
100 CAPSULE ORAL ONCE
Status: COMPLETED | OUTPATIENT
Start: 2024-08-26 | End: 2024-08-26

## 2024-08-26 RX ORDER — DIPHENHYDRAMINE HCL 25 MG
25 CAPSULE ORAL EVERY 6 HOURS PRN
Status: DISCONTINUED | OUTPATIENT
Start: 2024-08-26 | End: 2024-08-27 | Stop reason: HOSPADM

## 2024-08-26 RX ORDER — LEVOTHYROXINE SODIUM 112 UG/1
112 TABLET ORAL EVERY OTHER DAY
Status: DISCONTINUED | OUTPATIENT
Start: 2024-08-27 | End: 2024-08-27 | Stop reason: HOSPADM

## 2024-08-26 RX ORDER — LIDOCAINE HYDROCHLORIDE 10 MG/ML
1 INJECTION, SOLUTION EPIDURAL; INFILTRATION; INTRACAUDAL; PERINEURAL
Status: COMPLETED | OUTPATIENT
Start: 2024-08-26 | End: 2024-08-26

## 2024-08-26 RX ORDER — ACETAMINOPHEN 500 MG
1000 TABLET ORAL ONCE
Status: COMPLETED | OUTPATIENT
Start: 2024-08-26 | End: 2024-08-26

## 2024-08-26 RX ORDER — MAGNESIUM HYDROXIDE 1200 MG/15ML
LIQUID ORAL CONTINUOUS PRN
Status: DISCONTINUED | OUTPATIENT
Start: 2024-08-26 | End: 2024-08-26 | Stop reason: HOSPADM

## 2024-08-26 RX ORDER — NALOXONE HYDROCHLORIDE 0.4 MG/ML
INJECTION, SOLUTION INTRAMUSCULAR; INTRAVENOUS; SUBCUTANEOUS PRN
Status: DISCONTINUED | OUTPATIENT
Start: 2024-08-26 | End: 2024-08-26 | Stop reason: HOSPADM

## 2024-08-26 RX ORDER — MORPHINE SULFATE 2 MG/ML
4 INJECTION, SOLUTION INTRAMUSCULAR; INTRAVENOUS
Status: DISCONTINUED | OUTPATIENT
Start: 2024-08-26 | End: 2024-08-27 | Stop reason: HOSPADM

## 2024-08-26 RX ORDER — ONDANSETRON 2 MG/ML
4 INJECTION INTRAMUSCULAR; INTRAVENOUS EVERY 6 HOURS PRN
Status: DISCONTINUED | OUTPATIENT
Start: 2024-08-26 | End: 2024-08-27 | Stop reason: HOSPADM

## 2024-08-26 RX ORDER — LIDOCAINE HYDROCHLORIDE 20 MG/ML
INJECTION, SOLUTION EPIDURAL; INFILTRATION; INTRACAUDAL; PERINEURAL PRN
Status: DISCONTINUED | OUTPATIENT
Start: 2024-08-26 | End: 2024-08-26 | Stop reason: SDUPTHER

## 2024-08-26 RX ORDER — PROPOFOL 10 MG/ML
INJECTION, EMULSION INTRAVENOUS PRN
Status: DISCONTINUED | OUTPATIENT
Start: 2024-08-26 | End: 2024-08-26 | Stop reason: SDUPTHER

## 2024-08-26 RX ORDER — ATORVASTATIN CALCIUM 10 MG/1
10 TABLET, FILM COATED ORAL DAILY
Status: DISCONTINUED | OUTPATIENT
Start: 2024-08-26 | End: 2024-08-27 | Stop reason: HOSPADM

## 2024-08-26 RX ORDER — DIPHENHYDRAMINE HYDROCHLORIDE 50 MG/ML
25 INJECTION INTRAMUSCULAR; INTRAVENOUS EVERY 6 HOURS PRN
Status: DISCONTINUED | OUTPATIENT
Start: 2024-08-26 | End: 2024-08-27 | Stop reason: HOSPADM

## 2024-08-26 RX ORDER — ROPIVACAINE HYDROCHLORIDE 5 MG/ML
30 INJECTION, SOLUTION EPIDURAL; INFILTRATION; PERINEURAL ONCE
Status: COMPLETED | OUTPATIENT
Start: 2024-08-26 | End: 2024-08-26

## 2024-08-26 RX ORDER — FENTANYL CITRATE 50 UG/ML
INJECTION, SOLUTION INTRAMUSCULAR; INTRAVENOUS PRN
Status: DISCONTINUED | OUTPATIENT
Start: 2024-08-26 | End: 2024-08-26 | Stop reason: SDUPTHER

## 2024-08-26 RX ORDER — MIDAZOLAM HYDROCHLORIDE 2 MG/2ML
2 INJECTION, SOLUTION INTRAMUSCULAR; INTRAVENOUS
Status: COMPLETED | OUTPATIENT
Start: 2024-08-26 | End: 2024-08-26

## 2024-08-26 RX ORDER — FERROUS SULFATE 325(65) MG
325 TABLET ORAL
COMMUNITY

## 2024-08-26 RX ORDER — EPHEDRINE SULFATE/0.9% NACL/PF 25 MG/5 ML
SYRINGE (ML) INTRAVENOUS PRN
Status: DISCONTINUED | OUTPATIENT
Start: 2024-08-26 | End: 2024-08-26 | Stop reason: SDUPTHER

## 2024-08-26 RX ORDER — LISINOPRIL 5 MG/1
20 TABLET ORAL DAILY
Status: DISCONTINUED | OUTPATIENT
Start: 2024-08-26 | End: 2024-08-27 | Stop reason: HOSPADM

## 2024-08-26 RX ORDER — FENTANYL CITRATE 50 UG/ML
50 INJECTION, SOLUTION INTRAMUSCULAR; INTRAVENOUS EVERY 5 MIN PRN
Status: DISCONTINUED | OUTPATIENT
Start: 2024-08-26 | End: 2024-08-26 | Stop reason: HOSPADM

## 2024-08-26 RX ORDER — SUCCINYLCHOLINE/SOD CL,ISO/PF 100 MG/5ML
SYRINGE (ML) INTRAVENOUS PRN
Status: DISCONTINUED | OUTPATIENT
Start: 2024-08-26 | End: 2024-08-26 | Stop reason: SDUPTHER

## 2024-08-26 RX ORDER — PANTOPRAZOLE SODIUM 40 MG/1
40 TABLET, DELAYED RELEASE ORAL
Status: DISCONTINUED | OUTPATIENT
Start: 2024-08-27 | End: 2024-08-27 | Stop reason: HOSPADM

## 2024-08-26 RX ORDER — PHENYLEPHRINE HCL IN 0.9% NACL 1 MG/10 ML
SYRINGE (ML) INTRAVENOUS PRN
Status: DISCONTINUED | OUTPATIENT
Start: 2024-08-26 | End: 2024-08-26 | Stop reason: SDUPTHER

## 2024-08-26 RX ORDER — FAMOTIDINE 20 MG/1
20 TABLET, FILM COATED ORAL ONCE
Status: DISCONTINUED | OUTPATIENT
Start: 2024-08-26 | End: 2024-08-26 | Stop reason: HOSPADM

## 2024-08-26 RX ORDER — SODIUM CHLORIDE 0.9 % (FLUSH) 0.9 %
5-40 SYRINGE (ML) INJECTION EVERY 12 HOURS SCHEDULED
Status: DISCONTINUED | OUTPATIENT
Start: 2024-08-26 | End: 2024-08-27 | Stop reason: HOSPADM

## 2024-08-26 RX ORDER — DEXAMETHASONE SODIUM PHOSPHATE 4 MG/ML
INJECTION, SOLUTION INTRA-ARTICULAR; INTRALESIONAL; INTRAMUSCULAR; INTRAVENOUS; SOFT TISSUE PRN
Status: DISCONTINUED | OUTPATIENT
Start: 2024-08-26 | End: 2024-08-26 | Stop reason: SDUPTHER

## 2024-08-26 RX ORDER — KETOROLAC TROMETHAMINE 15 MG/ML
15 INJECTION, SOLUTION INTRAMUSCULAR; INTRAVENOUS EVERY 6 HOURS
Status: DISCONTINUED | OUTPATIENT
Start: 2024-08-26 | End: 2024-08-27 | Stop reason: HOSPADM

## 2024-08-26 RX ORDER — OXYCODONE HYDROCHLORIDE 5 MG/1
5 TABLET ORAL
Status: DISCONTINUED | OUTPATIENT
Start: 2024-08-26 | End: 2024-08-26 | Stop reason: HOSPADM

## 2024-08-26 RX ORDER — MORPHINE SULFATE 2 MG/ML
2 INJECTION, SOLUTION INTRAMUSCULAR; INTRAVENOUS
Status: DISCONTINUED | OUTPATIENT
Start: 2024-08-26 | End: 2024-08-27 | Stop reason: HOSPADM

## 2024-08-26 RX ADMIN — KETOROLAC TROMETHAMINE 15 MG: 15 INJECTION, SOLUTION INTRAMUSCULAR; INTRAVENOUS at 20:41

## 2024-08-26 RX ADMIN — MORPHINE SULFATE 4 MG: 2 INJECTION, SOLUTION INTRAMUSCULAR; INTRAVENOUS at 20:42

## 2024-08-26 RX ADMIN — SODIUM CHLORIDE, SODIUM LACTATE, POTASSIUM CHLORIDE, AND CALCIUM CHLORIDE: 600; 310; 30; 20 INJECTION, SOLUTION INTRAVENOUS at 08:57

## 2024-08-26 RX ADMIN — EPHEDRINE SULFATE 10 MG: 5 INJECTION INTRAVENOUS at 07:50

## 2024-08-26 RX ADMIN — FENTANYL CITRATE 50 MCG: 50 INJECTION INTRAMUSCULAR; INTRAVENOUS at 07:31

## 2024-08-26 RX ADMIN — Medication 100 MCG: at 08:41

## 2024-08-26 RX ADMIN — LIDOCAINE HYDROCHLORIDE 1 ML: 10 INJECTION, SOLUTION EPIDURAL; INFILTRATION; INTRACAUDAL; PERINEURAL at 07:10

## 2024-08-26 RX ADMIN — Medication 100 MCG: at 07:52

## 2024-08-26 RX ADMIN — ROCURONIUM BROMIDE 5 MG: 50 INJECTION INTRAVENOUS at 07:33

## 2024-08-26 RX ADMIN — Medication 100 MCG: at 08:02

## 2024-08-26 RX ADMIN — SODIUM CHLORIDE, SODIUM LACTATE, POTASSIUM CHLORIDE, AND CALCIUM CHLORIDE: 600; 310; 30; 20 INJECTION, SOLUTION INTRAVENOUS at 06:29

## 2024-08-26 RX ADMIN — ATORVASTATIN CALCIUM 10 MG: 10 TABLET, FILM COATED ORAL at 20:41

## 2024-08-26 RX ADMIN — SODIUM CHLORIDE, PRESERVATIVE FREE 10 ML: 5 INJECTION INTRAVENOUS at 20:42

## 2024-08-26 RX ADMIN — SODIUM CHLORIDE, PRESERVATIVE FREE 10 ML: 5 INJECTION INTRAVENOUS at 12:16

## 2024-08-26 RX ADMIN — Medication 100 MCG: at 09:02

## 2024-08-26 RX ADMIN — DEXAMETHASONE SODIUM PHOSPHATE 4 MG: 4 INJECTION INTRA-ARTICULAR; INTRALESIONAL; INTRAMUSCULAR; INTRAVENOUS; SOFT TISSUE at 07:40

## 2024-08-26 RX ADMIN — LISINOPRIL 20 MG: 5 TABLET ORAL at 20:41

## 2024-08-26 RX ADMIN — ONDANSETRON 4 MG: 2 INJECTION INTRAMUSCULAR; INTRAVENOUS at 09:05

## 2024-08-26 RX ADMIN — OXYCODONE HYDROCHLORIDE 10 MG: 10 TABLET ORAL at 17:31

## 2024-08-26 RX ADMIN — VANCOMYCIN HYDROCHLORIDE 1500 MG: 10 INJECTION, POWDER, LYOPHILIZED, FOR SOLUTION INTRAVENOUS at 06:43

## 2024-08-26 RX ADMIN — FENTANYL CITRATE 50 MCG: 50 INJECTION, SOLUTION INTRAMUSCULAR; INTRAVENOUS at 07:09

## 2024-08-26 RX ADMIN — ACETAMINOPHEN 325MG 650 MG: 325 TABLET ORAL at 17:31

## 2024-08-26 RX ADMIN — VANCOMYCIN HYDROCHLORIDE 1500 MG: 10 INJECTION, POWDER, LYOPHILIZED, FOR SOLUTION INTRAVENOUS at 19:09

## 2024-08-26 RX ADMIN — KETOROLAC TROMETHAMINE 15 MG: 15 INJECTION, SOLUTION INTRAMUSCULAR; INTRAVENOUS at 09:05

## 2024-08-26 RX ADMIN — ACETAMINOPHEN 1000 MG: 500 TABLET ORAL at 06:46

## 2024-08-26 RX ADMIN — Medication 100 MG: at 07:34

## 2024-08-26 RX ADMIN — ROPIVACAINE HYDROCHLORIDE 30 ML: 5 INJECTION EPIDURAL; INFILTRATION; PERINEURAL at 07:11

## 2024-08-26 RX ADMIN — AMLODIPINE BESYLATE 10 MG: 10 TABLET ORAL at 20:50

## 2024-08-26 RX ADMIN — PROPOFOL 140 MG: 10 INJECTION, EMULSION INTRAVENOUS at 07:33

## 2024-08-26 RX ADMIN — CELECOXIB 100 MG: 100 CAPSULE ORAL at 06:54

## 2024-08-26 RX ADMIN — ACETAMINOPHEN 325MG 650 MG: 325 TABLET ORAL at 12:08

## 2024-08-26 RX ADMIN — LIDOCAINE HYDROCHLORIDE 50 MG: 20 INJECTION, SOLUTION EPIDURAL; INFILTRATION; INTRACAUDAL; PERINEURAL at 07:33

## 2024-08-26 RX ADMIN — MIDAZOLAM 1 MG: 1 INJECTION INTRAMUSCULAR; INTRAVENOUS at 07:09

## 2024-08-26 RX ADMIN — FERROUS SULFATE TAB 325 MG (65 MG ELEMENTAL FE) 325 MG: 325 (65 FE) TAB at 12:09

## 2024-08-26 ASSESSMENT — PAIN DESCRIPTION - PAIN TYPE
TYPE: SURGICAL PAIN
TYPE: SURGICAL PAIN

## 2024-08-26 ASSESSMENT — PAIN DESCRIPTION - DESCRIPTORS
DESCRIPTORS: ACHING;DISCOMFORT
DESCRIPTORS: ACHING
DESCRIPTORS: ACHING

## 2024-08-26 ASSESSMENT — PAIN DESCRIPTION - ONSET
ONSET: ON-GOING
ONSET: GRADUAL

## 2024-08-26 ASSESSMENT — PAIN DESCRIPTION - LOCATION
LOCATION: SHOULDER
LOCATION: SHOULDER

## 2024-08-26 ASSESSMENT — PAIN SCALES - GENERAL
PAINLEVEL_OUTOF10: 4
PAINLEVEL_OUTOF10: 0
PAINLEVEL_OUTOF10: 8
PAINLEVEL_OUTOF10: 0
PAINLEVEL_OUTOF10: 1

## 2024-08-26 ASSESSMENT — PAIN SCALES - WONG BAKER
WONGBAKER_NUMERICALRESPONSE: NO HURT
WONGBAKER_NUMERICALRESPONSE: NO HURT
WONGBAKER_NUMERICALRESPONSE: HURTS A LITTLE BIT
WONGBAKER_NUMERICALRESPONSE: NO HURT

## 2024-08-26 ASSESSMENT — PAIN DESCRIPTION - FREQUENCY
FREQUENCY: INTERMITTENT
FREQUENCY: INTERMITTENT

## 2024-08-26 ASSESSMENT — PAIN - FUNCTIONAL ASSESSMENT
PAIN_FUNCTIONAL_ASSESSMENT: 0-10
PAIN_FUNCTIONAL_ASSESSMENT: PREVENTS OR INTERFERES SOME ACTIVE ACTIVITIES AND ADLS
PAIN_FUNCTIONAL_ASSESSMENT: ACTIVITIES ARE NOT PREVENTED

## 2024-08-26 ASSESSMENT — PAIN DESCRIPTION - ORIENTATION
ORIENTATION: LEFT
ORIENTATION: LEFT

## 2024-08-26 NOTE — NURSE NAVIGATOR
Patient provided with total shoulder replacement education book. Reviewed the use of incentive spirometry with her. Reminded patient that she should use ten times per hour while in hospital and to continue use at home hourly for the next few days to keep lungs expanded and free from complications. Reviewed proper sling wear with patient. Patient reminded that she is to wear her sling at all times except for showering. She is to ensure her underarm region is completely dry before placing sling back on. She was encouraged to ice hourly, 20 minutes, not to be placed directly on her skin. Reminded patient of the importance of staying on top of her pain medication rather than playing catch up as pain will be the worst for the next few days. Encouraged ambulation and repositioning. Patient verbalized understanding of all information provided . She has no questions or concerns at this time. She is voiding without difficulty and she has been cleared safe for discharge by Physical therapy. She will be spending the night and will be seen by OT in the morning. Will follow patient until discharge and then postoperatively.

## 2024-08-26 NOTE — ANESTHESIA POSTPROCEDURE EVALUATION
Department of Anesthesiology  Postprocedure Note    Patient: Deirdre Perez  MRN: [id number]  YOB: 1950  Date of evaluation: 8/26/2024    Procedure Summary       Date: 08/26/24 Room / Location: [facility] MAIN 05 / [facility] MAIN OR    Anesthesia Start: 0727 Anesthesia Stop: 0931    Procedure: LEFT REVERSE TOTAL SHOULDER ARTHROPLASTY; (Left: Shoulder) Diagnosis:       Closed 4-part fracture of surgical neck of left humerus with malunion      (Closed 4-part fracture of surgical neck of left humerus with malunion [S42.242P])    Surgeons: Alverto Qureshi MD Responsible Provider: Aly Arauz MD    Anesthesia Type: General, Regional ASA Status: 3            Anesthesia Type: General, Regional    Nola Phase I: Nola Score: 10    Nola Phase II:      Anesthesia Post Evaluation    Patient location during evaluation: PACU  Patient participation: complete - patient participated  Level of consciousness: sleepy but conscious  Pain score: 0  Airway patency: patent  Nausea & Vomiting: no nausea and no vomiting  Cardiovascular status: blood pressure returned to baseline  Respiratory status: acceptable  Hydration status: euvolemic  Pain management: adequate    No notable events documented.

## 2024-08-26 NOTE — PLAN OF CARE
Problem: Pain  Goal: Verbalizes/displays adequate comfort level or baseline comfort level  8/26/2024 1944 by Jeane Barnett RN  Outcome: Progressing  8/26/2024 1819 by Marcus Niño RN  Outcome: Progressing     Problem: Safety - Adult  Goal: Free from fall injury  8/26/2024 1944 by Jeane Barnett RN  Outcome: Progressing  8/26/2024 1819 by Marcus Niño RN  Outcome: Progressing     Problem: Discharge Planning  Goal: Discharge to home or other facility with appropriate resources  8/26/2024 1944 by Jeane Barnett RN  Outcome: Progressing  8/26/2024 1819 by Marcus Niño RN  Outcome: Progressing     Problem: ABCDS Injury Assessment  Goal: Absence of physical injury  8/26/2024 1944 by Jeane Barnett RN  Outcome: Progressing  8/26/2024 1819 by Marcus Niño RN  Outcome: Progressing

## 2024-08-26 NOTE — ANESTHESIA PROCEDURE NOTES
Peripheral Block    Patient location during procedure: pre-op  Reason for block: post-op pain management and at surgeon's request  Start time: 8/26/2024 7:08 AM  End time: 8/26/2024 7:14 AM  Staffing  Performed: anesthesiologist   Anesthesiologist: Aly Arauz MD  Performed by: Aly Arauz MD  Authorized by: Aly Arauz MD    Preanesthetic Checklist  Completed: patient identified, IV checked, site marked, risks and benefits discussed, surgical/procedural consents, equipment checked, pre-op evaluation, timeout performed, anesthesia consent given, oxygen available, monitors applied/VS acknowledged, fire risk safety assessment completed and verbalized and blood product R/B/A discussed and consented  Peripheral Block   Patient position: sitting  Prep: ChloraPrep  Provider prep: mask and sterile gloves  Patient monitoring: cardiac monitor, continuous pulse ox, continuous capnometry, frequent blood pressure checks, IV access, responsive to questions and oxygen  Block type: Brachial plexus  Interscalene  Laterality: left  Injection technique: single-shot  Guidance: ultrasound guided  Local infiltration: ropivacaine  Infiltration strength: 0.5 %  Local infiltration: ropivacaine  Dose: 30 mL    Needle   Needle type: insulated echogenic nerve stimulator needle   Needle gauge: 20 G  Needle localization: ultrasound guidance  Needle length: 10 cm  Assessment   Injection assessment: negative aspiration for heme, no paresthesia on injection, local visualized surrounding nerve on ultrasound and no intravascular symptoms  Paresthesia pain: none  Slow fractionated injection: yes  Hemodynamics: stable  Outcomes: uncomplicated and patient tolerated procedure well

## 2024-08-26 NOTE — PROGRESS NOTES
conducted a pre-surgery visit with Deirdre Perez, who is a 74 y.o.,female.     The  provided the following Interventions:  Initiated a relationship of care and support with patient and family members this morning before her shoulder surgery.  There is no advance directive on file.   Offered prayer on patient's behalf.     Plan:  Chaplains will continue to follow and will provide pastoral care on an as needed/requested basis.   recommends bedside caregivers page  on duty if patient shows signs of acute spiritual or emotional distress.  Chaplain Ulises Gore   Board Certified    Spiritual Care   [phone]

## 2024-08-26 NOTE — INTERVAL H&P NOTE
Update History & Physical    The patient's History and Physical of August 23, 2023 was reviewed with the patient and I examined the patient. There was no change. The surgical site was confirmed by the patient and me.     Plan: The risks, benefits, expected outcome, and alternative to the recommended procedure have been discussed with the patient. Patient understands and wants to proceed with the procedure.     Electronically signed by Alverto Qureshi MD on 8/26/2024 at 6:59 AM

## 2024-08-26 NOTE — PROGRESS NOTES
Physical Therapy  PHYSICAL THERAPY EVALUATION/DISCHARGE    Patient: Deirdre Perez (74 y.o. female)  Date: 8/26/2024  Primary Diagnosis: Closed 4-part fracture of surgical neck of left humerus with malunion [S42.242P]  S/P shoulder replacement, left [Z96.612]  Procedure(s) (LRB):  LEFT REVERSE TOTAL SHOULDER ARTHROPLASTY; (Left) Day of Surgery   Precautions: Surgical Protocols, ROM Restrictions, General Precautions,  ,  ,  ,  ,  ,  ,    PLOF: Pt lives alone in a single story home with 4 GLADYS with handrails.  Pt will be staying with Son initially with level entry.  Pt was independent with all ADLs, no AD.      ASSESSMENT AND RECOMMENDATIONS:  Pt sitting up in recliner upon entering room, L UE in ABD sling, family at bedside and agreeable to PT evaluation.  Pt was educated on precautions of wearing sling at all times except for dressing/showering, no ROM exercises and ability to use wrist/hand for light ADLs, pt verbalized understanding.  Pt performed STS independently with no AD and ambulated throughout hallway x 250', decreased meaghan but no LOB or gait deviations noted.  Pt used restroom upon returning and was independent with toilet transfer and jhon care.  Pt stayed up in recliner post evaluation, all needs met and call button by her side.  Pt verbalized understanding for need to call RN when wanting to get up to use the restroom. Patient does not require further skilled physical therapy intervention at this level of care and will be discharged from all acute care PT services at this time.     Further Equipment Recommendations for Discharge: None    AMPA: AM-PAC Inpatient Mobility Raw Score : 22      At this time and based on an AM-PAC score, no further PT is recommended upon discharge.  Recommend patient returns to prior setting with prior services.    This AMPA score should be considered in conjunction with interdisciplinary team recommendations to determine the most appropriate discharge setting. Patient's

## 2024-08-26 NOTE — PLAN OF CARE
Problem: Pain  Goal: Verbalizes/displays adequate comfort level or baseline comfort level  8/26/2024 1951 by Jeane Barnett RN  Outcome: Progressing  8/26/2024 1944 by Jeane Barnett RN  Outcome: Progressing  8/26/2024 1819 by Marcus Niño RN  Outcome: Progressing     Problem: Safety - Adult  Goal: Free from fall injury  8/26/2024 1951 by Jeane Barnett RN  Outcome: Progressing  8/26/2024 1944 by Jeane Barnett RN  Outcome: Progressing  8/26/2024 1819 by Marcus Niño RN  Outcome: Progressing     Problem: Discharge Planning  Goal: Discharge to home or other facility with appropriate resources  8/26/2024 1951 by Jeane Barnett RN  Outcome: Progressing  8/26/2024 1944 by Jeane Barnett RN  Outcome: Progressing  8/26/2024 1819 by Marcus Niño RN  Outcome: Progressing     Problem: ABCDS Injury Assessment  Goal: Absence of physical injury  8/26/2024 1951 by Jeane Barnett RN  Outcome: Progressing  8/26/2024 1944 by Jeane Barnett RN  Outcome: Progressing  8/26/2024 1819 by Marcus Niño RN  Outcome: Progressing

## 2024-08-26 NOTE — PROGRESS NOTES
4 Eyes Skin Assessment     NAME:  Deirdre Perez  YOB: 1950  MEDICAL RECORD NUMBER:  [id number]    The patient is being assessed for  Shift Handoff    I agree that at least one RN has performed a thorough Head to Toe Skin Assessment on the patient. ALL assessment sites listed below have been assessed.      Areas assessed by both nurses:    Head, Face, Ears, Shoulders, Back, Chest, Arms, Elbows, Hands, Sacrum. Buttock, Coccyx, Ischium, and Legs. Feet and Heels        Does the Patient have a Wound? No noted wound(s)       Rj Prevention initiated by RN: No  Wound Care Orders initiated by RN: No    Pressure Injury (Stage 3,4, Unstageable, DTI, NWPT, and Complex wounds) if present, place Wound referral order by RN under : No    New Ostomies, if present place, Ostomy referral order under : No     Nurse 1 eSignature: Electronically signed by Marcus Niño RN on 8/26/24 at 7:41 PM EDT    **SHARE this note so that the co-signing nurse can place an eSignature**    Nurse 2 eSignature: Electronically signed by Jeane Barnett RN on 8/26/24 at 7:45 PM EDT

## 2024-08-26 NOTE — ANESTHESIA PRE PROCEDURE
Department of Anesthesiology  Preprocedure Note       Name:  Deirdre Perez   Age:  74 y.o.  :  1950                                          MRN:  [id number]         Date:  2024      Surgeon: Surgeon(s):  Alverto Qureshi MD    Procedure: Procedure(s):  LEFT REVERSE TOTAL SHOULDER ARTHROPLASTY; [[facility] [facility]]; NERVE BLOCK; SPIDER, TMAX; 2 SA'S; 23 HR ADMIT    Medications prior to admission:   Prior to Admission medications    Medication Sig Start Date End Date Taking? Authorizing Provider   ferrous sulfate (IRON 325) 325 (65 Fe) MG tablet Take 1 tablet by mouth daily (with breakfast)   Yes Provider, MD Sg   amLODIPine-benazepril (LOTREL) 10-20 MG per capsule Take 1 capsule by mouth   Yes Automatic Reconciliation, Ar   bisoprolol-hydroCHLOROthiazide (ZIAC) 10-6.25 MG per tablet Take 1 tablet by mouth daily   Yes Automatic Reconciliation, Ar   diphenhydrAMINE (BENADRYL) 25 MG capsule Take 1 capsule by mouth   Yes Automatic Reconciliation, Ar   ergocalciferol (ERGOCALCIFEROL) 1.25 MG (47106 UT) capsule Take 1 capsule by mouth   Yes Automatic Reconciliation, Ar   levothyroxine (SYNTHROID) 100 MCG tablet Take 1 tablet by mouth every other day   Yes Automatic Reconciliation, Ar   levothyroxine (SYNTHROID) 112 MCG tablet Take 1 tablet by mouth every other day   Yes Automatic Reconciliation, Ar   omeprazole (PRILOSEC) 20 MG delayed release capsule Take 1 capsule by mouth daily   Yes Automatic Reconciliation, Ar   sertraline (ZOLOFT) 50 MG tablet Take 0.5 tablets by mouth   Yes Automatic Reconciliation, Ar   simvastatin (ZOCOR) 20 MG tablet Take 1 tablet by mouth   Yes Automatic Reconciliation, Ar   oxyCODONE-acetaminophen (PERCOCET) 5-325 MG per tablet Take 1-2 tablets by mouth every 4 hours as needed for Pain for up to 7 days. Intended supply: 7 days. Take lowest dose possible to manage pain Max Daily Amount: 12 tablets 24  Alverto Qureshi MD   carbonyl iron (FEOSOL) 45 MG  Pain in joint of left shoulder M25.512   • Closed 4-part fracture of surgical neck of left humerus with malunion S42.242P   • S/P shoulder replacement, left Z96.612       Past Medical History:        Diagnosis Date   • Acid reflux    • Asthma    • GERD (gastroesophageal reflux disease)    • Hypertension    • Hypothyroid    • Thyroid disease        Past Surgical History:        Procedure Laterality Date   • APPENDECTOMY         Social History:    Social History     Tobacco Use   • Smoking status: Never   • Smokeless tobacco: Not on file   Substance Use Topics   • Alcohol use: Yes     Alcohol/week: 2.5 standard drinks of alcohol                                Counseling given: Not Answered      Vital Signs (Current):   Vitals:    08/16/24 1331 08/26/24 0606   Weight: 89.4 kg (197 lb) 91.9 kg (202 lb 9.6 oz)   Height: 1.626 m (5' 4\") 1.626 m (5' 4\")                                              BP Readings from Last 3 Encounters:   08/23/24 (!) 149/71       NPO Status: Time of last liquid consumption: 2000                        Time of last solid consumption: 2000                        Date of last liquid consumption: 08/26/24                        Date of last solid food consumption: 08/25/24    BMI:   Wt Readings from Last 3 Encounters:   08/26/24 91.9 kg (202 lb 9.6 oz)   08/23/24 91.4 kg (201 lb 9.6 oz)   07/19/24 94.3 kg (208 lb)     Body mass index is 34.78 kg/m².    CBC:   Lab Results   Component Value Date/Time    WBC 4.5 08/01/2024 12:09 PM    RBC 4.11 08/01/2024 12:09 PM    HGB 11.8 08/01/2024 12:09 PM    HCT 36.2 08/01/2024 12:09 PM    MCV 88.1 08/01/2024 12:09 PM    RDW 15.8 08/01/2024 12:09 PM     08/01/2024 12:09 PM       CMP:   Lab Results   Component Value Date/Time     08/01/2024 12:09 PM    K 3.8 08/01/2024 12:09 PM     08/01/2024 12:09 PM    CO2 27 08/01/2024 12:09 PM    BUN 11 08/01/2024 12:09 PM    CREATININE 0.83 08/01/2024 12:09 PM    LABGLOM 74 08/01/2024 12:09 PM    GLUCOSE

## 2024-08-26 NOTE — BRIEF OP NOTE
Brief Postoperative Note      Patient: Deirdre Perez  YOB: 1950  MRN: [id number]    Date of Procedure: 8/26/2024    Pre-Op Diagnosis Codes:      * Closed 4-part fracture of surgical neck of left humerus with malunion [S42.242P]    Post-Op Diagnosis: Same       Procedure(s):  LEFT REVERSE TOTAL SHOULDER ARTHROPLASTY;    Surgeon(s):  Alverto Qureshi MD    Assistant:  Surgical Assistant: Immanuel Henderson; Idalmis Clifford    Anesthesia: General    Estimated Blood Loss (mL): less than 100     Complications: None    Specimens:   * No specimens in log *    Implants:  Implant Name Type Inv. Item Serial No.  Lot No. LRB No. Used Action   BASEPLATE GLENOID FULL AUGMENT 10 DEGREE 2+ MM 24 MM LATERAL - [id number]  BASEPLATE GLENOID FULL AUGMENT 10 DEGREE 2+ MM 24 MM LATERAL [id number] [employer] [id number] Left 1 Implanted   POST MODULAR F/AUNMENTED MGS BASEPLATE - [id number]  POST MODULAR F/AUNMENTED MGS BASEPLATE [id number] [employer] [id number] Left 1 Implanted   SCREW BONE L16MM DIA4.5MM PERIPH NONLOCKING FOR MOD ANA SYS - [id number]  SCREW BONE L16MM DIA4.5MM PERIPH NONLOCKING FOR MOD ANA SYS [id number] [employer] [id number] Left 1 Implanted   SCREW BNE L20MM [id number] PERIPH NONLOCKING FOR MOD ANA SYS - [id number]  SCREW BNE L20MM [id number] PERIPH NONLOCKING FOR MOD ANA SYS [id number] [employer]X INC-WD [id number] Left 1 Implanted   SCREW BNE L32MM DIA5.5MM PERIPH SAMANTHA FOR MOD ANA SYS - [id number]  SCREW BNE L32MM DIA5.5MM PERIPH SAMANTHA FOR MOD ANA SYS [id number] [employer]X INC-WD [id number] Left 1 Implanted   SPHERE ANA [id number] +4 BASEPLT 24MM SHLDR LAT TAPR MOD - [id number]  SPHERE ANA [id number] +4 BASEPLT 24MM SHLDR LAT TAPR MOD [id number] [employer]X [employer] [id number] Left 1 Implanted   SCREW BNE L28MM DIA5.5MM PERIPH SAMANTHA FOR MOD ANA SYS - [id number]  SCREW BNE L28MM DIA5.5MM PERIPH SAMANTHA FOR MOD ANA SYS [id number] [employer]X INC-WD [id number] Left 1 Implanted   STEM HUM SZ 5 SHLDR COAT CAP

## 2024-08-26 NOTE — CARE COORDINATION
Met with Patient at bedside. CM introduces self and explains role. Patient is alert and oriented x 4. Hipaa verified. Patient agrees to complete initial  assessment.     DCP: Daughter Jenifer will transport patient to her brothers where their mother will stay to recover.     No further CM needs identified, this CM will remain available should a need arise.      08/26/24 1210   Service Assessment   Patient Orientation Alert and Oriented;Person;Place;Situation;Self   Cognition Alert   History Provided By Patient   Primary Caregiver Self   Accompanied By/Relationship Daughter Jenifer and her sister.   Support Systems Family Members;/   Patient's Healthcare Decision Maker is: Legal Next of Kin   PCP Verified by CM Yes   Last Visit to PCP Within last 3 months   Prior Functional Level Independent in ADLs/IADLs   Current Functional Level Assistance with the following:;Housework;Shopping;Other (see comment)  (Transportation)   Can patient return to prior living arrangement Yes   Ability to make needs known: Good   Family able to assist with home care needs: Yes   Would you like for me to discuss the discharge plan with any other family members/significant others, and if so, who? Yes  (May discuss with my family in the room.)   Financial Resources Medicare   Social/Functional History   Lives With Alone   Type of Home House   Home Layout One level   Home Access Stairs to enter with rails   Entrance Stairs - Number of Steps 4   Entrance Stairs - Rails Both   Bathroom Shower/Tub Tub/Shower unit   Bathroom Toilet Standard   Bathroom Equipment None   Home Equipment None   Receives Help From Family   ADL Assistance Independent   Homemaking Assistance Needs assistance   Meal Prep Minimal   Laundry Total   Vacuuming Total   Cleaning Total   Gardening Total   Yard Work Total   Driving Total   Shopping Total   Homemaking Responsibilities Yes   Meal Prep Responsibility Primary   Laundry Responsibility Primary

## 2024-08-26 NOTE — PERIOP NOTE
Patient /Family /Designee has been informed that [facility] is not responsible for patient belongings per policy and the signed [facility] Patient Agreement document.  Personal items should be sent home or checked in with security.  Patient /Family /Designee selected the following action:                            [x]  Send personal items home with a family member or friend                                                 []  Check in personal items with security, excluding clothing                            []  Maintain personal items at the bedside, against recommendation                                 by Prieto Summers [facility]

## 2024-08-26 NOTE — PERIOP NOTE
TRANSFER - OUT REPORT:    Telephone report given to Marcus CH on Deirdre Perez  being transferred to [facility] for routine post-op       Report consisted of patient's Situation, Background, Assessment and   Recommendations(SBAR).     Information from the following report(s) Surgery Report and MAR was reviewed with the receiving nurse.           Lines:   Peripheral IV 08/26/24 Posterior;Right Hand (Active)   Site Assessment Clean, dry & intact 08/26/24 0927   Line Status Infusing 08/26/24 0927   Line Care Connections checked and tightened 08/26/24 0927   Phlebitis Assessment No symptoms 08/26/24 0927   Infiltration Assessment 0 08/26/24 0927   Dressing Status Clean, dry & intact 08/26/24 0927   Dressing Type Transparent 08/26/24 0927        Opportunity for questions and clarification was provided.      Patient transported with:  Tech

## 2024-08-26 NOTE — PROGRESS NOTES
Spiritual Health Assessment/Progress Note  [facility]    Pre-Op (iv-sa-eje),  ,  ,      Name: Deirdre Perez MRN: [id number]    Age: 74 y.o.     Sex: female   Language: English   Islam: Alevism   Closed 4-part fracture of surgical neck of left humerus with malunion     Date: 8/26/2024            Total Time Calculated: 10 min              Spiritual Assessment began in [facility] SURGERY        Referral/Consult From: Rounding   Encounter Overview/Reason: Pre-Op (iv-sa-eje)  Service Provided For:  (new admit pre-op  shoulder surgery)    Sabina, Belief, Meaning:   Patient is connected with a sabina tradition or spiritual practice  Family/Friends are connected with a sabina tradition or spiritual practice      Importance and Influence:  Patient has no beliefs influential to healthcare decision-making identified during this visit  Family/Friends have no beliefs influential to healthcare decision-making identified during this visit    Community:  Patient is connected with a spiritual community  Family/Friends are connected with a spiritual community:    Assessment and Plan of Care:     Patient Interventions include: Affirmed coping skills/support systems  Family/Friends Interventions include: Affirmed coping skills/support systems    Patient Plan of Care: Spiritual Care available upon further referral  Family/Friends Plan of Care: Spiritual Care available upon further referral    Electronically signed by Franko Gore Jr., [facility] on 8/26/2024 at 10:07 AM

## 2024-08-26 NOTE — OP NOTE
Operative Note      Patient: Deirdre Perez  YOB: 1950  MRN: [id number]    Date of Procedure: 8/26/2024    Pre-Op Diagnosis Codes:      * Closed 4-part fracture of surgical neck of left humerus with malunion [S42.242P]    Post-Op Diagnosis: Same       Procedure(s):  LEFT REVERSE TOTAL SHOULDER ARTHROPLASTY;    Surgeon(s):  Alverto Qureshi MD    Assistant:   Surgical Assistant: Immanuel Henderson; Idalmis Clifford    Anesthesia: General    Estimated Blood Loss (mL): less than 100     Complications: None    Specimens:   * No specimens in log *    Implants:  Implant Name Type Inv. Item Serial No.  Lot No. LRB No. Used Action   BASEPLATE GLENOID FULL AUGMENT 10 DEGREE 2+ MM 24 MM LATERAL - [id number]  BASEPLATE GLENOID FULL AUGMENT 10 DEGREE 2+ MM 24 MM LATERAL [id number] ARTHREX [employer] [id number] Left 1 Implanted   POST MODULAR F/AUNMENTED MGS BASEPLATE - [id number]  POST MODULAR F/AUNMENTED MGS BASEPLATE [id number] ARTHREX [employer]WD [id number] Left 1 Implanted   SCREW BONE L16MM DIA4.5MM PERIPH NONLOCKING FOR MOD ANA SYS - [id number]  SCREW BONE L16MM DIA4.5MM PERIPH NONLOCKING FOR MOD ANA SYS [id number] ARTHREX INC-[employer] [id number] Left 1 Implanted   SCREW BNE L20MM [id number] PERIPH NONLOCKING FOR MOD ANA SYS - [id number]  SCREW BNE L20MM [id number] PERIPH NONLOCKING FOR MOD ANA SYS [id number] AR[employer]X INC-WD [id number] Left 1 Implanted   SCREW BNE L32MM DIA5.5MM PERIPH SAMANTHA FOR MOD ANA SYS - [id number]  SCREW BNE L32MM DIA5.5MM PERIPH SAMANTHA FOR MOD ANA SYS [id number] ARTHREX [employer]-WD [id number] Left 1 Implanted   SPHERE ANA [id number] +4 BASEPLT 24MM SHLDR LAT TAPR MOD - [id number]  SPHERE ANA [id number] +4 BASEPLT 24MM SHLDR LAT TAPR MOD [id number] ARTHREX [employer]WD [id number] Left 1 Implanted   SCREW BNE L28MM DIA5.5MM PERIPH SAMANTHA FOR MOD ANA SYS - [id number]  SCREW BNE L28MM DIA5.5MM PERIPH SAMANTHA FOR MOD ANA SYS [id number] [employer] INC- [id number] Left 1 Implanted   STEM HUM SZ 5 SHLDR COAT CAP UNIVERS  stable with a +6 mm polyethylene.  The trial component was removed.  The humerus was copiously irrigated.  The final component was impacted into place.  The 6 mm polyethylene was impacted in place.  Again the shoulder was reduced and taken through range of motion and found to be stable.  The lesser tuberosity was debulked with a rongeur.  The wound was copiously irrigated.  Deltopectoral interval was closed.  Skin was closed in standard fashion.  Sterile dressing was placed.  Left arm was placed with sling.  Patient was awakened from anesthesia, extubated, and taken to PACU in stable condition with a plan for admission overnight.    Electronically signed by Alverto Qureshi MD on 8/26/2024 at 9:32 AM

## 2024-08-26 NOTE — NURSE NAVIGATOR
Rounded on patient s/p left reverse total shoulder replacement with Dr. Qureshi, dos 08/26/2024. She denies chest pain, shortness of breath, nausea, vomiting or pain at this time. She continues to remain numb from her block from her left shoulder down to her fingers. Sling observed to left shoulder, dressing to left shoulder observed to be clean, dry and intact with ice pack in place for comfort. Patient seated up on edge of bed. She denies any lightheadedness or dizziness. She was assisted to the restroom with steady gait where she was able to void without difficulty. Patient was able ambulate back to bedside chair and seated in position of comfort. Ice pack reapplied and call bell placed within reach. Will follow up with patient after lunch to provide education.

## 2024-08-26 NOTE — CARE COORDINATION
08/26/24 1206   IMM Letter   Observation Status Letter date given: 08/26/24   Observation Status Letter time given: 1206   Observation Status Letter given to Patient/Family/Significant other/Guardian/POA/by: Becky Mendoza RN CM, Copy to Patient, Original on Hard Chart.

## 2024-08-27 VITALS
DIASTOLIC BLOOD PRESSURE: 61 MMHG | TEMPERATURE: 96.9 F | WEIGHT: 202 LBS | BODY MASS INDEX: 34.49 KG/M2 | SYSTOLIC BLOOD PRESSURE: 141 MMHG | RESPIRATION RATE: 16 BRPM | HEART RATE: 69 BPM | HEIGHT: 64 IN | OXYGEN SATURATION: 92 %

## 2024-08-27 LAB
ERYTHROCYTE [DISTWIDTH] IN BLOOD BY AUTOMATED COUNT: 17.2 % (ref 11.6–14.5)
HCT VFR BLD AUTO: 32.9 % (ref 35–45)
HGB BLD-MCNC: 10.3 G/DL (ref 12–16)
MCH RBC QN AUTO: 28.5 PG (ref 24–34)
MCHC RBC AUTO-ENTMCNC: 31.3 G/DL (ref 31–37)
MCV RBC AUTO: 91.1 FL (ref 78–100)
NRBC # BLD: 0 K/UL (ref 0–0.01)
NRBC BLD-RTO: 0 PER 100 WBC
PLATELET # BLD AUTO: 222 K/UL (ref 135–420)
PMV BLD AUTO: 9.6 FL (ref 9.2–11.8)
RBC # BLD AUTO: 3.61 M/UL (ref 4.2–5.3)
WBC # BLD AUTO: 6.4 K/UL (ref 4.6–13.2)

## 2024-08-27 PROCEDURE — 6360000002 HC RX W HCPCS: Performed by: ORTHOPAEDIC SURGERY

## 2024-08-27 PROCEDURE — 96376 TX/PRO/DX INJ SAME DRUG ADON: CPT

## 2024-08-27 PROCEDURE — G0378 HOSPITAL OBSERVATION PER HR: HCPCS

## 2024-08-27 PROCEDURE — 6370000000 HC RX 637 (ALT 250 FOR IP): Performed by: ORTHOPAEDIC SURGERY

## 2024-08-27 PROCEDURE — 36415 COLL VENOUS BLD VENIPUNCTURE: CPT

## 2024-08-27 PROCEDURE — 96375 TX/PRO/DX INJ NEW DRUG ADDON: CPT

## 2024-08-27 PROCEDURE — 2580000003 HC RX 258: Performed by: ORTHOPAEDIC SURGERY

## 2024-08-27 PROCEDURE — 85027 COMPLETE CBC AUTOMATED: CPT

## 2024-08-27 PROCEDURE — 97535 SELF CARE MNGMENT TRAINING: CPT

## 2024-08-27 PROCEDURE — 97166 OT EVAL MOD COMPLEX 45 MIN: CPT

## 2024-08-27 PROCEDURE — 96374 THER/PROPH/DIAG INJ IV PUSH: CPT

## 2024-08-27 RX ORDER — ASPIRIN 325 MG
325 TABLET, DELAYED RELEASE (ENTERIC COATED) ORAL 2 TIMES DAILY
Qty: 30 TABLET | Refills: 3 | Status: SHIPPED | OUTPATIENT
Start: 2024-08-27

## 2024-08-27 RX ADMIN — ACETAMINOPHEN 325MG 650 MG: 325 TABLET ORAL at 05:53

## 2024-08-27 RX ADMIN — KETOROLAC TROMETHAMINE 15 MG: 15 INJECTION, SOLUTION INTRAMUSCULAR; INTRAVENOUS at 08:48

## 2024-08-27 RX ADMIN — ASPIRIN 325 MG: 325 TABLET, COATED ORAL at 08:49

## 2024-08-27 RX ADMIN — SODIUM CHLORIDE, PRESERVATIVE FREE 10 ML: 5 INJECTION INTRAVENOUS at 08:54

## 2024-08-27 RX ADMIN — LEVOTHYROXINE SODIUM 112 MCG: 112 TABLET ORAL at 05:53

## 2024-08-27 RX ADMIN — HYDROCHLOROTHIAZIDE 6.25 MG: 25 TABLET ORAL at 08:49

## 2024-08-27 RX ADMIN — ACETAMINOPHEN 325MG 650 MG: 325 TABLET ORAL at 00:21

## 2024-08-27 RX ADMIN — OXYCODONE HYDROCHLORIDE 10 MG: 10 TABLET ORAL at 05:53

## 2024-08-27 RX ADMIN — PANTOPRAZOLE SODIUM 40 MG: 40 TABLET, DELAYED RELEASE ORAL at 05:53

## 2024-08-27 RX ADMIN — FERROUS SULFATE TAB 325 MG (65 MG ELEMENTAL FE) 325 MG: 325 (65 FE) TAB at 08:49

## 2024-08-27 RX ADMIN — MORPHINE SULFATE 4 MG: 2 INJECTION, SOLUTION INTRAMUSCULAR; INTRAVENOUS at 01:25

## 2024-08-27 RX ADMIN — OXYCODONE HYDROCHLORIDE 10 MG: 10 TABLET ORAL at 00:21

## 2024-08-27 RX ADMIN — KETOROLAC TROMETHAMINE 15 MG: 15 INJECTION, SOLUTION INTRAMUSCULAR; INTRAVENOUS at 02:20

## 2024-08-27 RX ADMIN — METOPROLOL SUCCINATE 100 MG: 50 TABLET, EXTENDED RELEASE ORAL at 08:48

## 2024-08-27 ASSESSMENT — PAIN DESCRIPTION - LOCATION
LOCATION: SHOULDER
LOCATION: ARM;SHOULDER
LOCATION: SHOULDER

## 2024-08-27 ASSESSMENT — PAIN SCALES - WONG BAKER
WONGBAKER_NUMERICALRESPONSE: NO HURT

## 2024-08-27 ASSESSMENT — PAIN SCALES - GENERAL
PAINLEVEL_OUTOF10: 7
PAINLEVEL_OUTOF10: 5
PAINLEVEL_OUTOF10: 7
PAINLEVEL_OUTOF10: 8
PAINLEVEL_OUTOF10: 0
PAINLEVEL_OUTOF10: 5
PAINLEVEL_OUTOF10: 0

## 2024-08-27 ASSESSMENT — PAIN - FUNCTIONAL ASSESSMENT
PAIN_FUNCTIONAL_ASSESSMENT: ACTIVITIES ARE NOT PREVENTED

## 2024-08-27 ASSESSMENT — PAIN DESCRIPTION - ORIENTATION
ORIENTATION: LEFT

## 2024-08-27 ASSESSMENT — PAIN DESCRIPTION - DESCRIPTORS
DESCRIPTORS: ACHING;BURNING
DESCRIPTORS: ACHING;BURNING
DESCRIPTORS: ACHING
DESCRIPTORS: ACHING
DESCRIPTORS: ACHING;DISCOMFORT

## 2024-08-27 NOTE — DISCHARGE INSTRUCTIONS
DISCHARGE SUMMARY from Nurse    PATIENT INSTRUCTIONS:    After general anesthesia or intravenous sedation, for 24 hours or while taking prescription Narcotics:  Limit your activities  Do not drive and operate hazardous machinery  Do not make important personal or business decisions  Do  not drink alcoholic beverages  If you have not urinated within 8 hours after discharge, please contact your surgeon on call.    Report the following to your surgeon:  Excessive pain, swelling, redness or odor of or around the surgical area  Temperature over 100.5  Nausea and vomiting lasting longer than 4 hours or if unable to take medications  Any signs of decreased circulation or nerve impairment to extremity: change in color, persistent  numbness, tingling, coldness or increase pain  Any questions    What to do at Home:  Recommended activity: activity as tolerated,   R  If you experience any of the following symptoms chest pain, nausea, vomiting, chills, fever, pain unrelieved by medication, please follow up with Dr Qureshi.    *  Please give a list of your current medications to your Primary Care Provider.    *  Please update this list whenever your medications are discontinued, doses are      changed, or new medications (including over-the-counter products) are added.    *  Please carry medication information at all times in case of emergency situations.    These are general instructions for a healthy lifestyle:    No smoking/ No tobacco products/ Avoid exposure to second hand smoke  Surgeon General's Warning:  Quitting smoking now greatly reduces serious risk to your health.    Obesity, smoking, and sedentary lifestyle greatly increases your risk for illness    A healthy diet, regular physical exercise & weight monitoring are important for maintaining a healthy lifestyle    You may be retaining fluid if you have a history of heart failure or if you experience any of the following symptoms:  Weight gain of 3 pounds or more  overnight or 5 pounds in a week, increased swelling in our hands or feet or shortness of breath while lying flat in bed.  Please call your doctor as soon as you notice any of these symptoms; do not wait until your next office visit.    Patient armband removed and shredded   Thank you for enrolling in [facility]. Please follow the instructions below to securely access your online medical record. [facility] allows you to send messages to your doctor, view your test results, renew your prescriptions, schedule appointments, and more.     How Do I Sign Up?  In your Internet browser, go to [email or web address]  Click on the Sign Up Now link in the Sign In box. You will see the New Member Sign Up page.  Enter your [facility] Access Code exactly as it appears below. You will not need to use this code after you’ve completed the sign-up process. If you do not sign up before the expiration date, you must request a new code.  [facility] Access Code: Activation code not generated  Current [facility] Status: Patient Declined    Enter your Social Security Number ([id number]) and Date of Birth (mm/dd/yyyy) as indicated and click Submit. You will be taken to the next sign-up page.  Create a [facility] ID. This will be your [facility] login ID and cannot be changed, so think of one that is secure and easy to remember.  Create a [facility] password. You can change your password at any time.  Enter your Password Reset Question and Answer. This can be used at a later time if you forget your password.   Enter your e-mail address. You will receive e-mail notification when new information is available in [facility].  Click Sign Up. You can now view your medical record.     Additional Information  If you have questions, please contact your physician practice where you receive care. Remember, [facility] is NOT to be used for urgent needs. For medical emergencies, dial 911.   The discharge information has been reviewed with the {PATIENT PARENT

## 2024-08-27 NOTE — PROGRESS NOTES
4 Eyes Skin Assessment     NAME:  Deirdre Perez  YOB: 1950  MEDICAL RECORD NUMBER:  [id number]    The patient is being assessed for  Shift Handoff    I agree that at least one RN has performed a thorough Head to Toe Skin Assessment on the patient. ALL assessment sites listed below have been assessed.      Areas assessed by both nurses:    Head, Face, Ears, Shoulders, Back, Chest, Arms, Elbows, Hands, Sacrum. Buttock, Coccyx, Ischium, and Legs. Feet and Heels        Does the Patient have a Wound? No noted wound(s)       Rj Prevention initiated by RN: Yes  Wound Care Orders initiated by RN: No    Pressure Injury (Stage 3,4, Unstageable, DTI, NWPT, and Complex wounds) if present, place Wound referral order by RN under : No    New Ostomies, if present place, Ostomy referral order under : No     Nurse 1 eSignature: Electronically signed by Jeane Barnett RN on 8/27/24 at 6:39 AM EDT    **SHARE this note so that the co-signing nurse can place an eSignature**    Nurse 2 eSignature: {Esignature:[id number]}

## 2024-08-27 NOTE — PROGRESS NOTES
[facility]  Inpatient Progress Note      2024  7:39 AM     Chart reviewed.      Interval History/Events of Past 24 hours:   Left shoulder sx yesterday    Subjective:  No new complaints, left shoulder pain    Objective:  Vital signs:   /60   Pulse 74   Temp 97 °F (36.1 °C) (Oral)   Resp 16   Ht 1.626 m (5' 4\")   Wt 91.6 kg (202 lb)   LMP  (LMP Unknown)   SpO2 92%   BMI 34.67 kg/m²   Patient Vitals for the past 24 hrs:   Temp Pulse Resp BP SpO2   24 0623 -- -- 16 -- --   24 0300 97 °F (36.1 °C) 74 16 103/60 92 %   24 0155 -- -- 16 -- --   24 0125 -- -- 16 -- --   24 0051 -- -- 16 -- --   24 2330 97.8 °F (36.6 °C) 75 16 110/68 93 %   24 2112 -- -- 16 -- --   24 2041 -- -- 16 -- --   24 1910 97.4 °F (36.3 °C) 76 16 (!) 106/58 93 %   24 1830 98.5 °F (36.9 °C) 79 16 116/66 96 %   24 1801 -- -- 16 -- --   24 1450 97.7 °F (36.5 °C) 78 16 108/69 93 %   24 1139 -- -- -- -- 94 %   24 1053 97.5 °F (36.4 °C) 76 -- 122/72 92 %   24 1030 97 °F (36.1 °C) 73 17 -- 93 %   24 1029 -- 74 15 -- 94 %   24 1028 -- 74 15 125/63 94 %   24 1016 -- 76 18 -- 93 %   24 1007 -- 74 15 125/64 94 %   24 0957 -- 75 14 120/63 92 %   24 0947 -- 73 14 129/62 96 %   24 0937 -- 74 12 125/60 97 %   24 0931 97.4 °F (36.3 °C) 76 12 127/61 95 %   24 0927 97.4 °F (36.3 °C) 76 15 127/61 97 %     Temp (24hrs), Av.5 °F (36.4 °C), Min:97 °F (36.1 °C), Max:98.5 °F (36.9 °C)    Admission Weight: Last Weight   Weight - Scale: 89.4 kg (197 lb) Weight - Scale: 91.6 kg (202 lb)     Physical Examination:     General:  Alert, oriented, NAD  MS Exam: Left arm in sling.  Dressing clean and dry          Labs:  No results found for this or any previous visit (from the past 24 hour(s)).    New Studies:       Assessment/Plan:    POD 1 s/p left RSA  -Sling  -D/C home

## 2024-08-27 NOTE — PROGRESS NOTES
OCCUPATIONAL THERAPY EVALUATION/DISCHARGE    Patient: Deirdre Perez (74 y.o. 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      ASSESSMENT AND RECOMMENDATIONS:  Pt cleared to participate in OT evaluation by RN. Pt supine in bed, alert, and agreeable to participate with MD briefly present. Pt educated on shoulder precautions including no active use of shoulder, proper sling application/wear and importance of wrist flex/ext and ulnar and radial deviation, as well as active finger and hand movement to prevent edema and maintain function for light ADLs. Pt educated on compensatory strategies for self care tasks including bathing and dressing and able to dress self for d/c home with min assist. Pt reports daughter and son will be with her post d/c and able to assist prn. Patient with no further ADL concerns and left with all needs met and call bell in reach.      Vitals this session      Vitals   BP   O2   HR    141/77 EOB 95 %  80 bpm         Maximum therapeutic gains met at current level of care and patient will be discharged from occupational therapy at this time.    Further Equipment Recommendations for Discharge: None    [facility]: AM-PAC Inpatient Daily Activity Raw Score: 18    At this time and based on an AM-PAC score, no further OT is recommended upon discharge.  Recommend patient returns to prior setting with prior services.    This [facility]  understanding;Continued education needed    Thank you for this referral.  Serjio Moreno, OTR/L  Minutes: 39    Eval Complexity: Decision Making: Medium Complexity

## 2024-08-27 NOTE — DISCHARGE SUMMARY
DISCHARGE SUMMARY    Date of Admission: 8/26/24    Date of Discharge: 8/27/24    Admitting Diagnosis: Left malunited proximal humerus fracture s/p reverse shoulder replacement    Discharge Diagnosis: Same as above    Procedures Performed: Left reverse shoulder replacement    Description of Hospital Stay: The patient was admitted to the hospital on the above dates and underwent the above procedure(s) performed while admitted. During the hospital stay, the patient was seen by PT/OT and on the date of discharge the patient's vitals signs were stabilized and pain controlled on PO pain medication.    Discharge Medications: Please see discharge medication reconcilliation form for new prescriptions.    Discharge Disposition: Home    Discharge Condition: Stable    Follow up Visit: Please follow up in the office in 10-14 days.  Call [phone] to schedule an appointment if it has not already been scheduled.      Electronically Signed by     Alverto Qureshi MD

## 2024-08-30 ENCOUNTER — TELEPHONE (OUTPATIENT)
Facility: HOSPITAL | Age: 74
End: 2024-08-30

## 2024-08-30 NOTE — TELEPHONE ENCOUNTER
Call placed to patient, ID verified x 2. Patient is s/p left reverse total shoulder replacement with Dr. Qureshi, dos 08/26/2024. She denies chest pain, shortness of breath,nausea, vomiting, fever, or chills. She has no numbness to her upper left extremity, she denies any difficulty with bowel or bladder. She states that her pain is very well controlled. In fact she states that her pain is far better than it has ever been and she is so very grateful for all the care that she received at [facility]. She continues to wear her sling at all times. She will be taking her shower today. Reminded patient to ensure that her underarm region is completely dry after her shower before putting her shirt and sling back on. Patient verbalized understanding . She has some bruising and swelling but she continues to ice. Overall she is doing very well and is extremely pleased with her outcome. She states her dressing at this point is clean, dry and intact. She has no questions or concerns at this time. She will follow up with Dr. Payne in two weeks or sooner if needed.

## 2024-09-03 ENCOUNTER — OFFICE VISIT (OUTPATIENT)
Age: 74
End: 2024-09-03
Payer: MEDICARE

## 2024-09-03 DIAGNOSIS — Z96.612 STATUS POST REVERSE TOTAL REPLACEMENT OF LEFT SHOULDER: Primary | ICD-10-CM

## 2024-09-03 PROCEDURE — 99024 POSTOP FOLLOW-UP VISIT: CPT | Performed by: ORTHOPAEDIC SURGERY

## 2024-09-03 PROCEDURE — 73030 X-RAY EXAM OF SHOULDER: CPT | Performed by: ORTHOPAEDIC SURGERY

## 2024-09-03 NOTE — PROGRESS NOTES
[facility] AMBULATORY OFFICE NOTE    Patient: Deirdre Perez                MRN: [id number]       SSN: [id number]  YOB: 1950        AGE: 74 y.o.        SEX: female  There is no height or weight on file to calculate BMI.    PCP: Franco Pugh MD  09/03/24    Chief Complaint: Left shoulder follow-up    HPI: Deirdre Perez is a 74 y.o. female patient who returns today for her left shoulder.  She is now 1 week postop from her reverse shoulder replacement for malunited proximal humerus fracture.  She is doing well.  She says she has no pain.    Past Medical History:   Diagnosis Date    Acid reflux     Asthma     GERD (gastroesophageal reflux disease)     Hypertension     Hypothyroid     Thyroid disease        Family History   Problem Relation Age of Onset    Hypertension Daughter     Diabetes Sister     Heart Disease Sister     Hypertension Son     Colon Cancer Father     Prostate Cancer Father     Stroke Father     Diabetes Father     Hypertension Mother     Heart Disease Mother     Diabetes Mother     Osteoporosis Mother     Hypertension Father     Heart Disease Father        Current Outpatient Medications   Medication Sig Dispense Refill    aspirin 325 MG EC tablet Take 1 tablet by mouth in the morning and at bedtime 30 tablet 3    ferrous sulfate (IRON 325) 325 (65 Fe) MG tablet Take 1 tablet by mouth daily (with breakfast)      amLODIPine-benazepril (LOTREL) 10-20 MG per capsule Take 1 capsule by mouth      bisoprolol-hydroCHLOROthiazide (ZIAC) 10-6.25 MG per tablet Take 1 tablet by mouth daily      carbonyl iron (FEOSOL) 45 MG TABS Take 1 tablet by mouth daily (Patient not taking: Reported on 8/16/2024)      diphenhydrAMINE (BENADRYL) 25 MG capsule Take 1 capsule by mouth      ergocalciferol (ERGOCALCIFEROL) 1.25 MG (84508 UT) capsule Take 1 capsule by mouth      levothyroxine (SYNTHROID) 100 MCG tablet Take 1 tablet by mouth every other day      levothyroxine

## 2024-10-01 ENCOUNTER — OFFICE VISIT (OUTPATIENT)
Age: 74
End: 2024-10-01
Payer: MEDICARE

## 2024-10-01 DIAGNOSIS — Z96.612 STATUS POST REVERSE TOTAL REPLACEMENT OF LEFT SHOULDER: Primary | ICD-10-CM

## 2024-10-01 PROCEDURE — 99024 POSTOP FOLLOW-UP VISIT: CPT | Performed by: ORTHOPAEDIC SURGERY

## 2024-10-01 PROCEDURE — 73030 X-RAY EXAM OF SHOULDER: CPT | Performed by: ORTHOPAEDIC SURGERY

## 2024-10-01 NOTE — PROGRESS NOTES
Take 1 tablet by mouth every other day      omeprazole (PRILOSEC) 20 MG delayed release capsule Take 1 capsule by mouth daily      oxyCODONE-acetaminophen (PERCOCET) 5-325 MG per tablet 1-2 tablets every 4-6 hours prn pain      sertraline (ZOLOFT) 50 MG tablet Take 0.5 tablets by mouth      simvastatin (ZOCOR) 20 MG tablet Take 1 tablet by mouth       No current facility-administered medications for this visit.       Allergies   Allergen Reactions    Cefprozil Hives    Iodinated Contrast Media Itching     Patient not sure if has allergy, patient stated started itching past a CT scan with dye    Shellfish Allergy Itching     Itching past eating shrimp, but eats shrimp sometimes.       Past Surgical History:   Procedure Laterality Date    APPENDECTOMY      SHOULDER SURGERY Left 8/26/2024    LEFT REVERSE TOTAL SHOULDER ARTHROPLASTY; performed by Alverto Qureshi MD at [facility] MAIN OR       Social History     Socioeconomic History    Marital status:      Spouse name: Not on file    Number of children: Not on file    Years of education: Not on file    Highest education level: Not on file   Occupational History    Not on file   Tobacco Use    Smoking status: Never    Smokeless tobacco: Not on file   Substance and Sexual Activity    Alcohol use: Yes     Alcohol/week: 2.5 standard drinks of alcohol    Drug use: Not on file    Sexual activity: Not on file   Other Topics Concern    Not on file   Social History Narrative    Not on file     Social Determinants of Health     Financial Resource Strain: Not on file   Food Insecurity: No Food Insecurity (8/26/2024)    Hunger Vital Sign     Worried About Running Out of Food in the Last Year: Never true     Ran Out of Food in the Last Year: Never true   Transportation Needs: No Transportation Needs (8/26/2024)    PRAPARE - Transportation     Lack of Transportation (Medical): No     Lack of Transportation (Non-Medical): No   Physical Activity: Not on file   Stress: Not on file

## 2024-10-18 ENCOUNTER — HOSPITAL ENCOUNTER (OUTPATIENT)
Facility: HOSPITAL | Age: 74
Setting detail: RECURRING SERIES
Discharge: HOME | End: 2024-10-21
Payer: MEDICARE

## 2024-10-18 PROCEDURE — 97161 PT EVAL LOW COMPLEX 20 MIN: CPT

## 2024-10-18 PROCEDURE — 97535 SELF CARE MNGMENT TRAINING: CPT

## 2024-10-18 PROCEDURE — 97110 THERAPEUTIC EXERCISES: CPT

## 2024-10-18 NOTE — PROGRESS NOTES
[facility]  [address] [facility] [address] [place] [address] Ph:[phone] Fx: [phone]    PLAN OF CARE/ Statement of Necessity for Physical Therapy Services           Patient name: Deirdre Perez Start of Care: 10/18/2024   Referral source: Alverto Qureshi MD : 1950    Medical Diagnosis: Left shoulder pain [M25.512]       Onset Date: 2024   Treatment Diagnosis: M25.512  LEFT SHOULDER PAIN                                     Prior Hospitalization: see medical history Provider#: [id number]   Medications: Verified on Patient Summary List     Comorbidities: Asthma, HTN, Hypothyroidism, Thyroid disease, GERD.    Prior Level of Function:  functionally independent, no AD, fairly active lifestyle, but not so much since  due to shoulder issues.     MATHEUS subjective:   Pt fell at night in her home 2022. She sustained a humerus fx. Pt was put in a sling and tried to wait for healing. She started PT at [facility] in 2022, but continued to have excruciating pain. She was told her shoulder was healed, and released from that doctors office. She then went to Dr. Qureshi, and was told she would need a TSA. It took a year due to iron deficiency, but after a year she received a reverse TSA on 2024.     The Plan of Care and following information is based on the information from the initial evaluation.    Assessment / key information:  73 yo female who presents to [facility] with c/o Shoulder pain. Patient is s/p reverse TSA on 2024. Patient reports minimal pain with ADLs, but unable to perform most AROM. She is very limited in AROM, but PROM is quite good. Patient overall is limited in mobility, strength, ADLs, and recreational activities. Patient demonstrates decreased ROM, decreased strength, impaired posture, pain and decreased functional mobility tolerance.       Evaluation Complexity:  History:  MEDIUM  Complexity : 1-2 
3        OBJECTIVE      15 min []Eval - untimed                      Therapeutic Procedures:  Tx Min Billable or 1:1 Min (if diff from Tx Min) Procedure, Rationale, Specifics   10  72580 Therapeutic Exercise (timed):  increase ROM, strength, coordination, balance, and proprioception to improve patient's ability to progress to PLOF and address remaining functional goals. (see flow sheet as applicable)    Details if applicable:       13  18835 Self Care/Home Management (timed):  improve patient knowledge and understanding of pain reducing techniques, positioning, activity modification, diagnosis/prognosis, and physical therapy expectations, procedures and progression  to improve patient's ability to progress to PLOF and address remaining functional goals.  (see flow sheet as applicable)    Details if applicable:            Details if applicable:           Details if applicable:            Details if applicable:     23  [facility] Totals Reminder: bill using total billable min of TIMED therapeutic procedures (example: do not include dry needle or estim unattended, both untimed codes, in totals to left)  8-22 min = 1 unit; 23-37 min = 2 units; 38-52 min = 3 units; 53-67 min = 4 units; 68-82 min = 5 units   Total Total     TOTAL TREATMENT TIME:        38     [x]  Patient Education billed concurrently with other procedures   [x] Review HEP    [] Progressed/Changed HEP, detail:    [] Other detail:       General Evaluation    ROM:                             AROM    PROM   Shoulder Left Right Left Right   Flexion 35 150 100 WNL   Extension 30 60 50 WNL   ABD 50 160 80 WNL   ER 45 80 30 WNL   IR Reaches to back left pocket 70 50 WNL             Strength (MMT):  Shoulder Left (1-5) Right (1-5)   Shoulder Flexion 3 4+   Shoulder Ext 4- 4+   Shoulder ABD 3 4+   Shoulder ADD 4- 4+   Shoulder IR 4- 4+   Shoulder ER 4- 4+                                               Functional Mobility: WNL    Sensation: WNL      Pain Level (0-10

## 2024-10-23 ENCOUNTER — HOSPITAL ENCOUNTER (OUTPATIENT)
Facility: HOSPITAL | Age: 74
Setting detail: RECURRING SERIES
Discharge: HOME | End: 2024-10-26
Payer: MEDICARE

## 2024-10-23 PROCEDURE — 97530 THERAPEUTIC ACTIVITIES: CPT

## 2024-10-23 PROCEDURE — 97112 NEUROMUSCULAR REEDUCATION: CPT

## 2024-10-23 PROCEDURE — 97110 THERAPEUTIC EXERCISES: CPT

## 2024-10-23 PROCEDURE — 97140 MANUAL THERAPY 1/> REGIONS: CPT

## 2024-10-23 NOTE — PROGRESS NOTES
PHYSICAL / OCCUPATIONAL THERAPY - DAILY TREATMENT NOTE     Patient Name: Deirdre Perez    Date: 10/23/2024    : 1950  Insurance: Payor: MEDICARE / Plan: MEDICARE PART A AND B / Product Type: *No Product type* /      Patient  verified Yes     Visit #   Current / Total 2 24   Time   In / Out 2:30 3:20   Pain   In / Out 0/10 2/10   Subjective Functional Status/Changes: Pt denies pain currently.    Changes to:  Allergies, Med Hx, Sx Hx?   no       TREATMENT AREA =  Left shoulder pain [M25.512]    If an interpreting service is utilized for treatment of this patient, the contents of this document represent the material reviewed with the patient via the .     OBJECTIVE    Modalities Rationale:     decrease inflammation and decrease pain to improve patient's ability to progress to PLOF and address remaining functional goals.     min [] Estim Unattended, type/location:                                      []  w/ice    []  w/heat    min [] Estim Attended, type/location:                                     []  w/US     []  w/ice    []  w/heat    []  TENS insruct      min []  Mechanical Traction: type/lbs                   []  pro   []  sup   []  int   []  cont    []  before manual    []  after manual    min []  Ultrasound, settings/location:      min []  Iontophoresis w/ dexamethasone, location:                                               []  take home patch       []  in clinic     10   min  unbilled [x]  Ice     []  Heat    location/position: Left shoulder/supine    min []  Paraffin,  details:     min []  Vasopneumatic Device, press/temp:     min []  Whirlpool / Fluido:    If using vaso (only need to measure limb vaso being performed on)      pre-treatment girth :       post-treatment girth :       measured at (landmark location) :      min []  Other:    Skin assessment post-treatment (if applicable):    []  intact    []  redness- no adverse reaction                 []redness - adverse reaction:

## 2024-10-30 ENCOUNTER — HOSPITAL ENCOUNTER (OUTPATIENT)
Facility: HOSPITAL | Age: 74
Setting detail: RECURRING SERIES
Discharge: HOME | End: 2024-11-02
Payer: MEDICARE

## 2024-10-30 PROCEDURE — 97140 MANUAL THERAPY 1/> REGIONS: CPT

## 2024-10-30 PROCEDURE — 97110 THERAPEUTIC EXERCISES: CPT

## 2024-10-30 NOTE — PROGRESS NOTES
Progress Note/Recertification    Short Term Goals: To be accomplished in 12 treatments:  Patient will be independent and compliant with HEP to progress toward goals and restore functional mobility.   Eval Status: issued at eval  Current: Met per patient report. 10/23/2024     Patient will improve DASH score by 9 points to improve functional tolerance for exercise.   Eval Status: DASH: 38.6%     Patient will demonstrate ability to perform AAROM left shoulder flexion and abduction to 140 deg or greater in order to work towards improving AROM activities.   Eval status: NT     Pt will have 4-/5 left shoulder strength to return to goals of washing her hair carrying light objects during gardening activities.  Eval Status:   Shoulder Left (1-5) Right (1-5)   Shoulder Flexion 3 4+   Shoulder Ext 4- 4+   Shoulder ABD 3 4+   Shoulder ADD 4- 4+   Shoulder IR 4- 4+   Shoulder ER 4- 4+         Pt will have painfree left shoulder PROM WFL to aid in functional mechanics for ADLs.  Eval Status:   Left Right   100 WNL   50 WNL   80 WNL   30 WNL   50 WNL    Current:PROM Left shoulder flexion 105 Degrees.      Long Term Goals: To be accomplished in 24 treatments:  Patient will improve DASH score by 18 points to improve functional tolerance for ADLs and gardening activities.  Eval Status: DASH: 38.6%     Pt will have painfree left shoulder AROM WFL to aid in functional mechanics for ADLs.  Eval Status:   Shoulder Left Right   Flexion 35 150   Extension 30 60   ABD 50 160   ER 45 80   IR Reaches to back left pocket 70         Pt will have 4+/5 or greater left shoulder strength to return to goals of reaching overhead and carrying items in home.  Eval Status:   Shoulder Left (1-5) Right (1-5)   Shoulder Flexion 3 4+   Shoulder Ext 4- 4+   Shoulder ABD 3 4+   Shoulder ADD 4- 4+   Shoulder IR 4- 4+   Shoulder ER 4- 4+         Patient will improve pain in lft to 0/10 at worst to improve daily activities, recreational activity tolerance, and

## 2024-11-01 ENCOUNTER — HOSPITAL ENCOUNTER (OUTPATIENT)
Facility: HOSPITAL | Age: 74
Setting detail: RECURRING SERIES
Discharge: HOME | End: 2024-11-04
Payer: MEDICARE

## 2024-11-01 PROCEDURE — 97110 THERAPEUTIC EXERCISES: CPT

## 2024-11-01 PROCEDURE — 97016 VASOPNEUMATIC DEVICE THERAPY: CPT

## 2024-11-01 PROCEDURE — 97112 NEUROMUSCULAR REEDUCATION: CPT

## 2024-11-01 PROCEDURE — 97140 MANUAL THERAPY 1/> REGIONS: CPT

## 2024-11-01 NOTE — PROGRESS NOTES
strength. Pt continues to have muscle fatigue with finger ladder performing only 12 reps today. Pt was provided hand out of progression for HEP. Used vaso to address post op pain and swelling with good tolerance.     Patient will continue to benefit from skilled PT / OT services to modify and progress therapeutic interventions, analyze and address functional mobility deficits, analyze and address ROM deficits, analyze and address strength deficits, analyze and address soft tissue restrictions, analyze and cue for proper movement patterns, and analyze and modify for postural abnormalities to address functional deficits and attain remaining goals.    Progress toward goals / Updated goals:  []  See Progress Note/Recertification    Short Term Goals: To be accomplished in 12 treatments:  Patient will be independent and compliant with HEP to progress toward goals and restore functional mobility.   Eval Status: issued at eval  Current: Met per patient report. 10/23/2024     Patient will improve DASH score by 9 points to improve functional tolerance for exercise.   Eval Status: DASH: 38.6%     Patient will demonstrate ability to perform AAROM left shoulder flexion and abduction to 140 deg or greater in order to work towards improving AROM activities.   Eval status: NT     Pt will have 4-/5 left shoulder strength to return to goals of washing her hair carrying light objects during gardening activities.  Eval Status:   Shoulder Left (1-5) Right (1-5)   Shoulder Flexion 3 4+   Shoulder Ext 4- 4+   Shoulder ABD 3 4+   Shoulder ADD 4- 4+   Shoulder IR 4- 4+   Shoulder ER 4- 4+         Pt will have painfree left shoulder PROM WFL to aid in functional mechanics for ADLs.  Eval Status:   Left Right   100 WNL   50 WNL   80 WNL   30 WNL   50 WNL    Current:PROM Left shoulder flexion 105 Degrees.      Long Term Goals: To be accomplished in 24 treatments:  Patient will improve DASH score by 18 points to improve functional tolerance for

## 2024-11-04 ENCOUNTER — HOSPITAL ENCOUNTER (OUTPATIENT)
Facility: HOSPITAL | Age: 74
Setting detail: RECURRING SERIES
Discharge: HOME | End: 2024-11-07
Payer: MEDICARE

## 2024-11-04 PROCEDURE — 97112 NEUROMUSCULAR REEDUCATION: CPT

## 2024-11-04 PROCEDURE — 97140 MANUAL THERAPY 1/> REGIONS: CPT

## 2024-11-04 PROCEDURE — 97110 THERAPEUTIC EXERCISES: CPT

## 2024-11-04 NOTE — PROGRESS NOTES
items in home.  Eval Status:   Shoulder Left (1-5) Right (1-5)   Shoulder Flexion 3 4+   Shoulder Ext 4- 4+   Shoulder ABD 3 4+   Shoulder ADD 4- 4+   Shoulder IR 4- 4+   Shoulder ER 4- 4+            Current 11/1/24: progressed to iso flex, ext, AB for increased muscle activation and strength      Patient will improve pain in lft to 0/10 at worst to improve daily activities, recreational activity tolerance, and restore prior level of function.  Eval Status: 4/10 at worst    PLAN  Yes  Continue plan of care  []  Upgrade activities as tolerated  []  Discharge due to :  []  Other:    Shayy Barrios PTA    11/4/2024    2:33 PM    Future Appointments   Date Time Provider Department Center   11/8/2024 11:00 AM Alverto Qureshi MD [facility]   11/8/2024  1:10 PM Tom Goddard, PT [facility]   11/13/2024  1:50 PM Tom Goddard, PT [facility]   11/15/2024  1:50 PM Reynaldo Jc PT [facility]   11/20/2024  1:50 PM Shayy Barrios PTA [facility]   11/22/2024  1:50 PM Tom Goddard, PT [facility]   11/25/2024  1:50 PM Tom Goddard, PT [facility]   11/27/2024  1:50 PM Tom Goddard, PT [facility]

## 2024-11-07 ENCOUNTER — TELEPHONE (OUTPATIENT)
Facility: HOSPITAL | Age: 74
End: 2024-11-07

## 2024-11-07 NOTE — TELEPHONE ENCOUNTER
called to offer appt on 11/7/24 due to therapist being out on 11/8, patient unable to take 11/7 appt, will call back if any afternoon appts on 11/8 cxl

## 2024-11-08 ENCOUNTER — APPOINTMENT (OUTPATIENT)
Facility: HOSPITAL | Age: 74
End: 2024-11-08
Payer: MEDICARE

## 2024-11-08 ENCOUNTER — OFFICE VISIT (OUTPATIENT)
Age: 74
End: 2024-11-08

## 2024-11-08 DIAGNOSIS — Z96.612 STATUS POST REVERSE TOTAL REPLACEMENT OF LEFT SHOULDER: Primary | ICD-10-CM

## 2024-11-08 PROCEDURE — 99024 POSTOP FOLLOW-UP VISIT: CPT | Performed by: ORTHOPAEDIC SURGERY

## 2024-11-08 NOTE — PROGRESS NOTES
[facility] AMBULATORY OFFICE NOTE    Patient: Deirdre Perez                MRN: [id number]       SSN: [id number]  YOB: 1950        AGE: 74 y.o.        SEX: female  There is no height or weight on file to calculate BMI.    PCP: Franco Pugh MD  11/08/24    Chief Complaint: Left shoulder follow-up    HPI: Deirdre Perez is a 74 y.o. female patient who returns today for her left shoulder.  She is doing very well in terms of pain.  She is working with physical therapy on her motion and strength.    Past Medical History:   Diagnosis Date    Acid reflux     Asthma     GERD (gastroesophageal reflux disease)     Hypertension     Hypothyroid     Thyroid disease        Family History   Problem Relation Age of Onset    Hypertension Daughter     Diabetes Sister     Heart Disease Sister     Hypertension Son     Colon Cancer Father     Prostate Cancer Father     Stroke Father     Diabetes Father     Hypertension Mother     Heart Disease Mother     Diabetes Mother     Osteoporosis Mother     Hypertension Father     Heart Disease Father        Current Outpatient Medications   Medication Sig Dispense Refill    aspirin 325 MG EC tablet Take 1 tablet by mouth in the morning and at bedtime 30 tablet 3    ferrous sulfate (IRON 325) 325 (65 Fe) MG tablet Take 1 tablet by mouth daily (with breakfast)      amLODIPine-benazepril (LOTREL) 10-20 MG per capsule Take 1 capsule by mouth      bisoprolol-hydroCHLOROthiazide (ZIAC) 10-6.25 MG per tablet Take 1 tablet by mouth daily      carbonyl iron (FEOSOL) 45 MG TABS Take 1 tablet by mouth daily (Patient not taking: Reported on 8/16/2024)      diphenhydrAMINE (BENADRYL) 25 MG capsule Take 1 capsule by mouth      ergocalciferol (ERGOCALCIFEROL) 1.25 MG (32509 UT) capsule Take 1 capsule by mouth      levothyroxine (SYNTHROID) 100 MCG tablet Take 1 tablet by mouth every other day      levothyroxine (SYNTHROID) 112 MCG tablet Take 1 tablet by

## 2024-11-13 ENCOUNTER — HOSPITAL ENCOUNTER (OUTPATIENT)
Facility: HOSPITAL | Age: 74
Setting detail: RECURRING SERIES
Discharge: HOME | End: 2024-11-16
Payer: MEDICARE

## 2024-11-13 PROCEDURE — 97530 THERAPEUTIC ACTIVITIES: CPT

## 2024-11-13 PROCEDURE — 97112 NEUROMUSCULAR REEDUCATION: CPT

## 2024-11-13 PROCEDURE — 97110 THERAPEUTIC EXERCISES: CPT

## 2024-11-13 PROCEDURE — 97016 VASOPNEUMATIC DEVICE THERAPY: CPT

## 2024-11-13 NOTE — PROGRESS NOTES
[]redness - adverse reaction:         Therapeutic Procedures:  Tx Min Billable or 1:1 Min (if diff from Tx Min) Procedure, Rationale, Specifics   20  20005 Therapeutic Exercise (timed):  increase ROM, strength, coordination, balance, and proprioception to improve patient's ability to progress to PLOF and address remaining functional goals. (see flow sheet as applicable)    Details if applicable:       10  92200 Neuromuscular Re-Education (timed):  improve balance, coordination, kinesthetic sense, posture, core stability and proprioception to improve patient's ability to develop conscious control of individual muscles and awareness of position of extremities in order to progress to PLOF and address remaining functional goals. (see flow sheet as applicable)    Details if applicable:     15  96924 Therapeutic Activity (timed):  use of dynamic activities replicating functional movements to increase ROM, strength, coordination, balance, and proprioception in order to improve patient's ability to progress to PLOF and address remaining functional goals.  (see flow sheet as applicable)     Details if applicable:                 Details if applicable:     45  [facility] Totals Reminder: bill using total billable min of TIMED therapeutic procedures (example: do not include dry needle or estim unattended, both untimed codes, in totals to left)  8-22 min = 1 unit; 23-37 min = 2 units; 38-52 min = 3 units; 53-67 min = 4 units; 68-82 min = 5 units   Total Total     TOTAL TREATMENT TIME:        55     [x]  Patient Education billed concurrently with other procedures   [x] Review HEP    [] Progressed/Changed HEP, detail:    [] Other detail:       Objective Information/Functional Measures/Assessment    Pt continues to demo significantly reduced ROM with overhead exercises, requiring moderate verbal cues to reduce compensatory strategies, and to maximize stretching at the end range of AAROM exercises. Will continue progressing pt as

## 2024-11-15 ENCOUNTER — TELEPHONE (OUTPATIENT)
Facility: HOSPITAL | Age: 74
End: 2024-11-15

## 2024-11-15 ENCOUNTER — HOSPITAL ENCOUNTER (OUTPATIENT)
Facility: HOSPITAL | Age: 74
Setting detail: RECURRING SERIES
End: 2024-11-15
Payer: MEDICARE

## 2024-11-20 ENCOUNTER — HOSPITAL ENCOUNTER (OUTPATIENT)
Facility: HOSPITAL | Age: 74
Setting detail: RECURRING SERIES
Discharge: HOME | End: 2024-11-23
Payer: MEDICARE

## 2024-11-20 PROCEDURE — 97530 THERAPEUTIC ACTIVITIES: CPT

## 2024-11-20 PROCEDURE — 97110 THERAPEUTIC EXERCISES: CPT

## 2024-11-20 PROCEDURE — 97112 NEUROMUSCULAR REEDUCATION: CPT

## 2024-11-20 NOTE — PROGRESS NOTES
[facility]  [address] [facility] [address] [place] [address] - Ph: [phone]   Fx: [phone]    PHYSICAL THERAPY PROGRESS NOTE      Patient name: Deirdre Perez Start of Care: 10/18/2024   Referral source: Alverto Qureshi MD : 1950               Medical Diagnosis: Left shoulder pain [M25.512]        Onset Date: 2024   Treatment Diagnosis: M25.512  LEFT SHOULDER PAIN                                     Prior Hospitalization: see medical history Provider#: [id number]   Medications: Verified on Patient Summary List      Comorbidities: Asthma, HTN, Hypothyroidism, Thyroid disease, GERD.     Prior Level of Function:  functionally independent, no AD, fairly active lifestyle, but not so much since  due to shoulder issues.     Reporting Period: 2024-2024    Visits from Start of Care: 7    Missed Visits: 0/10    Goals/Measure of Progress: To be achieved in 12 weeks:    Short Term Goals: To be accomplished in 12 treatments:  Patient will be independent and compliant with HEP to progress toward goals and restore functional mobility.   Eval Status: issued at eval  PN (2024): Met per patient report.      Patient will improve DASH score by 9 points to improve functional tolerance for exercise.   Eval Status: DASH: 38.6%  PN (2024): regressed to 40.9%.      Patient will demonstrate ability to perform AAROM left shoulder flexion and abduction to 140 deg or greater in order to work towards improving AROM activities.   Eval status: NT   PN (2024): AAROM Left shoulder flexion 118 degrees.   Pt will have 4-/5 left shoulder strength to return to goals of washing her hair carrying light objects during gardening activities.  Eval Status:   Shoulder Left (1-5) Right (1-5)   Shoulder Flexion 3 4+   Shoulder Ext 4- 4+   Shoulder ABD 3 4+   Shoulder ADD 4- 4+   Shoulder IR 4- 4+   Shoulder ER 4- 4+`    PN: 2024   Shoulder Left 
DASH: 38.6%  Current: regressed to 40.9%. 11/20/2024   Pt will have painfree left shoulder AROM WFL to aid in functional mechanics for ADLs.  Eval Status:   Shoulder Left Right   Flexion 35 150   Extension 30 60   ABD 50 160   ER 45 80   IR Reaches to back left pocket 70    Current:11/20/2024  Shoulder Left Right   Flexion 80 150   Extension 30 60   ABD 70 160   ER 45 80   IR Reaches to back left pocket 70         Pt will have 4+/5 or greater left shoulder strength to return to goals of reaching overhead and carrying items in home.  Eval Status:   Shoulder Left (1-5) Right (1-5)   Shoulder Flexion 3 4+   Shoulder Ext 4- 4+   Shoulder ABD 3 4+   Shoulder ADD 4- 4+   Shoulder IR 4- 4+   Shoulder ER 4- 4+            Current 11/1/24: progressed to iso flex, ext, AB for increased muscle activation and strength      Patient will improve pain in lft to 0/10 at worst to improve daily activities, recreational activity tolerance, and restore prior level of function.  Eval Status: 4/10 at worst  Current: 11/13/24 pt reports pain at 2/10 at worst.  PROGRESSING    PLAN  Yes  Continue plan of care  []  Upgrade activities as tolerated  []  Discharge due to :  []  Other:    Shayy Barrios PTA    11/20/2024    2:11 PM    Future Appointments   Date Time Provider Department Center   11/22/2024  1:50 PM Tom Goddard, PT [facility]   11/25/2024  1:50 PM Tom Goddard, PT [facility]   11/27/2024  1:50 PM Elliott Duncan PTA [facility]   12/20/2024 10:30 AM Alverto Qureshi MD [facility]

## 2024-11-22 ENCOUNTER — HOSPITAL ENCOUNTER (OUTPATIENT)
Facility: HOSPITAL | Age: 74
Setting detail: RECURRING SERIES
Discharge: HOME | End: 2024-11-25
Payer: MEDICARE

## 2024-11-22 PROCEDURE — 97110 THERAPEUTIC EXERCISES: CPT

## 2024-11-22 PROCEDURE — 97016 VASOPNEUMATIC DEVICE THERAPY: CPT

## 2024-11-22 PROCEDURE — 97112 NEUROMUSCULAR REEDUCATION: CPT

## 2024-11-22 PROCEDURE — 97140 MANUAL THERAPY 1/> REGIONS: CPT

## 2024-11-22 NOTE — PROGRESS NOTES
PHYSICAL / OCCUPATIONAL THERAPY - DAILY TREATMENT NOTE     Patient Name: Deirdre Perez    Date: 2024    : 1950  Insurance: Payor: MEDICARE / Plan: MEDICARE PART A AND B / Product Type: *No Product type* /      Patient  verified Yes     Visit #   Current / Total 8 24   Time   In / Out 1:49pm 2:45pm   Pain   In / Out 0 0   Subjective Functional Status/Changes: Pt reports that her shoulder has been continuing to improve. Just getting sore from exercises.    Changes to:  Allergies, Med Hx, Sx Hx?   no       TREATMENT AREA =  Left shoulder pain [M25.512]    If an interpreting service is utilized for treatment of this patient, the contents of this document represent the material reviewed with the patient via the .     OBJECTIVE    Modalities Rationale:     decrease edema, decrease inflammation, and decrease pain to improve patient's ability to progress to PLOF and address remaining functional goals.     min [] Estim Unattended, type/location:                                      []  w/ice    []  w/heat    min [] Estim Attended, type/location:                                     []  w/US     []  w/ice    []  w/heat    []  TENS insruct      min []  Mechanical Traction: type/lbs                   []  pro   []  sup   []  int   []  cont    []  before manual    []  after manual    min []  Ultrasound, settings/location:      min []  Iontophoresis w/ dexamethasone, location:                                               []  take home patch       []  in clinic        min  unbilled []  Ice     []  Heat    location/position:     min []  Paraffin,  details:    10 min [x]  Vasopneumatic Device, press/temp: LT/LP    min []  Whirlpool / Fluido:    If using vaso (only need to measure limb vaso being performed on)      pre-treatment girth :       post-treatment girth :       measured at (landmark location) :      min []  Other:    Skin assessment post-treatment (if applicable):    []  intact    []  redness-

## 2024-11-25 ENCOUNTER — HOSPITAL ENCOUNTER (OUTPATIENT)
Facility: HOSPITAL | Age: 74
Setting detail: RECURRING SERIES
Discharge: HOME | End: 2024-11-28
Payer: MEDICARE

## 2024-11-25 PROCEDURE — 97110 THERAPEUTIC EXERCISES: CPT

## 2024-11-25 PROCEDURE — 97140 MANUAL THERAPY 1/> REGIONS: CPT

## 2024-11-25 PROCEDURE — 97112 NEUROMUSCULAR REEDUCATION: CPT

## 2024-11-25 NOTE — PROGRESS NOTES
applicable):    []  intact    []  redness- no adverse reaction                 []redness - adverse reaction:         Therapeutic Procedures:  Tx Min Billable or 1:1 Min (if diff from Tx Min) Procedure, Rationale, Specifics   18  79127 Therapeutic Exercise (timed):  increase ROM, strength, coordination, balance, and proprioception to improve patient's ability to progress to PLOF and address remaining functional goals. (see flow sheet as applicable)    Details if applicable:       13  58770 Neuromuscular Re-Education (timed):  improve balance, coordination, kinesthetic sense, posture, core stability and proprioception to improve patient's ability to develop conscious control of individual muscles and awareness of position of extremities in order to progress to PLOF and address remaining functional goals. (see flow sheet as applicable)    Details if applicable:     8  47177 Manual Therapy (timed):  increase ROM and increase tissue extensibility to improve patient's ability to progress to PLOF and address remaining functional goals.  The manual therapy interventions were performed at a separate and distinct time from the therapeutic activities interventions . Details: Shoulder PROM. MFR of left UT.       Details if applicable:           Details if applicable:            Details if applicable:     39  [facility] Totals Reminder: bill using total billable min of TIMED therapeutic procedures (example: do not include dry needle or estim unattended, both untimed codes, in totals to left)  8-22 min = 1 unit; 23-37 min = 2 units; 38-52 min = 3 units; 53-67 min = 4 units; 68-82 min = 5 units   Total Total     TOTAL TREATMENT TIME:        49     [x]  Patient Education billed concurrently with other procedures   [x] Review HEP    [] Progressed/Changed HEP, detail:    [] Other detail:       Objective Information/Functional Measures/Assessment    Pt tolerates session well. Continuing to focus on improving active mobility and strength.

## 2024-11-27 ENCOUNTER — HOSPITAL ENCOUNTER (OUTPATIENT)
Facility: HOSPITAL | Age: 74
Setting detail: RECURRING SERIES
Discharge: HOME | End: 2024-11-30
Payer: MEDICARE

## 2024-11-27 PROCEDURE — 97530 THERAPEUTIC ACTIVITIES: CPT

## 2024-11-27 PROCEDURE — 97112 NEUROMUSCULAR REEDUCATION: CPT

## 2024-11-27 PROCEDURE — 97110 THERAPEUTIC EXERCISES: CPT

## 2024-11-27 NOTE — PROGRESS NOTES
PHYSICAL / OCCUPATIONAL THERAPY - DAILY TREATMENT NOTE     Patient Name: Deirdre Perez    Date: 2024    : 1950  Insurance: Payor: MEDICARE / Plan: MEDICARE PART A AND B / Product Type: *No Product type* /      Patient  verified Yes     Visit #   Current / Total 10 24   Time   In / Out 1:52 2:42   Pain   In / Out 2/10 0/10   Subjective Functional Status/Changes: Pt reports soreness.    Changes to:  Allergies, Med Hx, Sx Hx?   no       TREATMENT AREA =  Left shoulder pain [M25.512]    If an interpreting service is utilized for treatment of this patient, the contents of this document represent the material reviewed with the patient via the .     OBJECTIVE    Modalities Rationale:     decrease inflammation and decrease pain to improve patient's ability to progress to PLOF and address remaining functional goals.    10 min [x]  Vasopneumatic Device, press/temp: Low/low   If using vaso (only need to measure limb vaso being performed on)      pre-treatment girth :       post-treatment girth :       measured at (landmark location) :     Skin assessment post-treatment (if applicable):    []  intact    []  redness- no adverse reaction                 []redness - adverse reaction:         Therapeutic Procedures:  Tx Min Billable or 1:1 Min (if diff from Tx Min) Procedure, Rationale, Specifics   20  26278 Therapeutic Exercise (timed):  increase ROM, strength, coordination, balance, and proprioception to improve patient's ability to progress to PLOF and address remaining functional goals. (see flow sheet as applicable)    Details if applicable:       10  01495 Neuromuscular Re-Education (timed):  improve balance, coordination, kinesthetic sense, posture, core stability and proprioception to improve patient's ability to develop conscious control of individual muscles and awareness of position of extremities in order to progress to PLOF and address remaining functional goals. (see flow sheet as

## 2024-12-04 ENCOUNTER — TELEPHONE (OUTPATIENT)
Facility: HOSPITAL | Age: 74
End: 2024-12-04

## 2024-12-10 ENCOUNTER — HOSPITAL ENCOUNTER (OUTPATIENT)
Facility: HOSPITAL | Age: 74
Setting detail: RECURRING SERIES
Discharge: HOME | End: 2024-12-13
Payer: MEDICARE

## 2024-12-10 PROCEDURE — 97112 NEUROMUSCULAR REEDUCATION: CPT

## 2024-12-10 PROCEDURE — 97110 THERAPEUTIC EXERCISES: CPT

## 2024-12-10 NOTE — PROGRESS NOTES
PHYSICAL / OCCUPATIONAL THERAPY - DAILY TREATMENT NOTE     Patient Name: Deirdre Perez    Date: 12/10/2024    : 1950  Insurance: Payor: MEDICARE / Plan: MEDICARE PART A AND B / Product Type: *No Product type* /      Patient  verified Yes     Visit #   Current / Total 11 24   Time   In / Out 2:30 3:20   Pain   In / Out 0/10 0/10   Subjective Functional Status/Changes: Pt reports no new changes   Changes to:  Allergies, Med Hx, Sx Hx?   no       TREATMENT AREA =  Left shoulder pain [M25.512]    If an interpreting service is utilized for treatment of this patient, the contents of this document represent the material reviewed with the patient via the .     OBJECTIVE    Modalities Rationale:     decrease inflammation and decrease pain to improve patient's ability to progress to PLOF and address remaining functional goals.    10 min [x]  Vasopneumatic Device, press/temp: Low/low   If using vaso (only need to measure limb vaso being performed on)      pre-treatment girth :       post-treatment girth :       measured at (landmark location) :     Skin assessment post-treatment (if applicable):    []  intact    []  redness- no adverse reaction                 []redness - adverse reaction:         Therapeutic Procedures:  Tx Min Billable or 1:1 Min (if diff from Tx Min) Procedure, Rationale, Specifics   25  57115 Therapeutic Exercise (timed):  increase ROM, strength, coordination, balance, and proprioception to improve patient's ability to progress to PLOF and address remaining functional goals. (see flow sheet as applicable)    Details if applicable:       15  36668 Neuromuscular Re-Education (timed):  improve balance, coordination, kinesthetic sense, posture, core stability and proprioception to improve patient's ability to develop conscious control of individual muscles and awareness of position of extremities in order to progress to PLOF and address remaining functional goals. (see flow sheet as

## 2024-12-11 ENCOUNTER — HOSPITAL ENCOUNTER (OUTPATIENT)
Facility: HOSPITAL | Age: 74
Setting detail: RECURRING SERIES
Discharge: HOME | End: 2024-12-14
Payer: MEDICARE

## 2024-12-11 PROCEDURE — 97140 MANUAL THERAPY 1/> REGIONS: CPT

## 2024-12-11 PROCEDURE — 97112 NEUROMUSCULAR REEDUCATION: CPT

## 2024-12-11 PROCEDURE — 97110 THERAPEUTIC EXERCISES: CPT

## 2024-12-11 NOTE — PROGRESS NOTES
PHYSICAL / OCCUPATIONAL THERAPY - DAILY TREATMENT NOTE     Patient Name: Deirdre Perez    Date: 2024    : 1950  Insurance: Payor: MEDICARE / Plan: MEDICARE PART A AND B / Product Type: *No Product type* /      Patient  verified Yes     Visit #   Current / Total 12 24   Time   In / Out 1:54 2:45   Pain   In / Out 0/10 0/10   Subjective Functional Status/Changes: Pt reported no new complaints.   Changes to:  Allergies, Med Hx, Sx Hx?   no       TREATMENT AREA =  Left shoulder pain [M25.512]    If an interpreting service is utilized for treatment of this patient, the contents of this document represent the material reviewed with the patient via the .     OBJECTIVE    Modalities Rationale:     decrease inflammation and decrease pain to improve patient's ability to progress to PLOF and address remaining functional goals.    10 min [x]  Vasopneumatic Device, press/temp: Low/Low   If using vaso (only need to measure limb vaso being performed on)      pre-treatment girth :       post-treatment girth :       measured at (landmark location) :     Skin assessment post-treatment (if applicable):    []  intact    []  redness- no adverse reaction                 []redness - adverse reaction:         Therapeutic Procedures:  Tx Min Billable or 1:1 Min (if diff from Tx Min) Procedure, Rationale, Specifics   17  85990 Therapeutic Exercise (timed):  increase ROM, strength, coordination, balance, and proprioception to improve patient's ability to progress to PLOF and address remaining functional goals. (see flow sheet as applicable)    Details if applicable:       16  21066 Neuromuscular Re-Education (timed):  improve balance, coordination, kinesthetic sense, posture, core stability and proprioception to improve patient's ability to develop conscious control of individual muscles and awareness of position of extremities in order to progress to PLOF and address remaining functional goals. (see flow sheet

## 2024-12-17 ENCOUNTER — HOSPITAL ENCOUNTER (OUTPATIENT)
Facility: HOSPITAL | Age: 74
Setting detail: RECURRING SERIES
Discharge: HOME | End: 2024-12-20
Payer: MEDICARE

## 2024-12-17 PROCEDURE — 97110 THERAPEUTIC EXERCISES: CPT

## 2024-12-17 PROCEDURE — 97140 MANUAL THERAPY 1/> REGIONS: CPT

## 2024-12-17 PROCEDURE — 97112 NEUROMUSCULAR REEDUCATION: CPT

## 2024-12-17 NOTE — PROGRESS NOTES
PHYSICAL / OCCUPATIONAL THERAPY - DAILY TREATMENT NOTE     Patient Name: Deirdre Perez    Date: 2024    : 1950  Insurance: Payor: MEDICARE / Plan: MEDICARE PART A AND B / Product Type: *No Product type* /      Patient  verified Yes     Visit #   Current / Total 13 24   Time   In / Out 1:14 2:08   Pain   In / Out 0/10 0/10   Subjective Functional Status/Changes: Pt reports no new complaints.   Changes to:  Allergies, Med Hx, Sx Hx?   no       TREATMENT AREA =  Left shoulder pain [M25.512]    If an interpreting service is utilized for treatment of this patient, the contents of this document represent the material reviewed with the patient via the .     OBJECTIVE    Modalities Rationale:     decrease inflammation and decrease pain to improve patient's ability to progress to PLOF and address remaining functional goals.    10 min [x]  Vasopneumatic Device, press/temp: Low/low   If using vaso (only need to measure limb vaso being performed on)      pre-treatment girth :       post-treatment girth :       measured at (landmark location) :     Skin assessment post-treatment (if applicable):    []  intact    []  redness- no adverse reaction                 []redness - adverse reaction:         Therapeutic Procedures:  Tx Min Billable or 1:1 Min (if diff from Tx Min) Procedure, Rationale, Specifics   20  37976 Therapeutic Exercise (timed):  increase ROM, strength, coordination, balance, and proprioception to improve patient's ability to progress to PLOF and address remaining functional goals. (see flow sheet as applicable)    Details if applicable:       16  80282 Neuromuscular Re-Education (timed):  improve balance, coordination, kinesthetic sense, posture, core stability and proprioception to improve patient's ability to develop conscious control of individual muscles and awareness of position of extremities in order to progress to PLOF and address remaining functional goals. (see flow sheet as

## 2024-12-19 ENCOUNTER — HOSPITAL ENCOUNTER (OUTPATIENT)
Facility: HOSPITAL | Age: 74
Setting detail: RECURRING SERIES
Discharge: HOME | End: 2024-12-22
Payer: MEDICARE

## 2024-12-19 PROCEDURE — 97140 MANUAL THERAPY 1/> REGIONS: CPT

## 2024-12-19 PROCEDURE — 97016 VASOPNEUMATIC DEVICE THERAPY: CPT

## 2024-12-19 PROCEDURE — 97110 THERAPEUTIC EXERCISES: CPT

## 2024-12-19 PROCEDURE — 97112 NEUROMUSCULAR REEDUCATION: CPT

## 2024-12-19 NOTE — PROGRESS NOTES
[facility]  [address] [place] [address] - Ph: [phone]   Fx: [phone]    PHYSICAL THERAPY PROGRESS NOTE         Patient name: Deirdre Perez Start of Care: 10/18/2024   Referral source: Alverto Qureshi MD : 1950               Medical Diagnosis: Left shoulder pain [M25.512]        Onset Date: 2024   Treatment Diagnosis: M25.512  LEFT SHOULDER PAIN                                     Prior Hospitalization: see medical history Provider#: [id number]   Medications: Verified on Patient Summary List      Comorbidities: Asthma, HTN, Hypothyroidism, Thyroid disease, GERD.     Prior Level of Function:  functionally independent, no AD, fairly active lifestyle, but not so much since  due to shoulder issues.      Visits from Start of Care: 14    Missed Visits: 0    Goals/Measure of Progress: To be achieved in 4 weeks:    Progress toward goals / Updated goals:  []  See Progress Note/Recertification  Short Term Goals: To be accomplished in 12 treatments:  Patient will be independent and compliant with HEP to progress toward goals and restore functional mobility.   Eval Status: issued at eval  PN (2024): Met per patient report.      Patient will improve DASH score by 9 points to improve functional tolerance for exercise.   Eval Status: DASH: 38.6%  PN (2024): regressed to 40.9%.      Patient will demonstrate ability to perform AAROM left shoulder flexion and abduction to 140 deg or greater in order to work towards improving AROM activities.   Eval status: NT  PN: AAROM Left shoulder flexion 130 degrees.     Pt will have 4-/5 left shoulder strength to return to goals of washing her hair carrying light objects during gardening activities.  Eval Status:   Shoulder Left (1-5) Right (1-5)   Shoulder Flexion 3 4+   Shoulder Ext 4- 4+   Shoulder ABD 3 4+   Shoulder ADD 4- 4+   Shoulder IR 4- 4+   Shoulder ER 4- 4+`    PN:

## 2024-12-19 NOTE — PROGRESS NOTES
PHYSICAL / OCCUPATIONAL THERAPY - DAILY TREATMENT NOTE     Patient Name: Deirdre Perez    Date: 2024    : 1950  Insurance: Payor: MEDICARE / Plan: MEDICARE PART A AND B / Product Type: *No Product type* /      Patient  verified Yes     Visit #   Current / Total 14 24   Time   In / Out 2:40 3:30   Pain   In / Out 0/10 0/10   Subjective Functional Status/Changes: The patient states she has a chief complaint of difficulty reaching her arm up.    Changes to:  Allergies, Med Hx, Sx Hx?   no       TREATMENT AREA =  Left shoulder pain [M25.512]    If an interpreting service is utilized for treatment of this patient, the contents of this document represent the material reviewed with the patient via the .     OBJECTIVE  Modalities Rationale:     decrease edema, decrease inflammation, and decrease pain to improve patient's ability to progress to PLOF and address remaining functional goals.    10 min [x]  Vasopneumatic Device, press/temp:  LP/LT  left shoulder   If using vaso (only need to measure limb vaso being performed on)      pre-treatment girth :       post-treatment girth :       measured at (landmark location) :      min []  Other:    Skin assessment post-treatment (if applicable):    []  intact    []  redness- no adverse reaction                 []redness - adverse reaction:         Therapeutic Procedures:  Tx Min Billable or 1:1 Min (if diff from Tx Min) Procedure, Rationale, Specifics   20  01828 Therapeutic Exercise (timed):  increase ROM, strength, coordination, balance, and proprioception to improve patient's ability to progress to PLOF and address remaining functional goals. (see flow sheet as applicable)    Details if applicable:       12  93993 Neuromuscular Re-Education (timed):  improve balance, coordination, kinesthetic sense, posture, core stability and proprioception to improve patient's ability to develop conscious control of individual muscles and awareness of position of

## 2024-12-20 ENCOUNTER — OFFICE VISIT (OUTPATIENT)
Age: 74
End: 2024-12-20

## 2024-12-20 VITALS — HEIGHT: 64 IN | BODY MASS INDEX: 34.67 KG/M2

## 2024-12-20 DIAGNOSIS — Z96.612 STATUS POST REVERSE TOTAL REPLACEMENT OF LEFT SHOULDER: Primary | ICD-10-CM

## 2024-12-20 NOTE — PROGRESS NOTES
[facility] AMBULATORY OFFICE NOTE    Patient: Deirdre Perez                MRN: [id number]       SSN: [id number]  YOB: 1950        AGE: 74 y.o.        SEX: female  Body mass index is 34.67 kg/m².    PCP: Franco Pugh MD  12/20/24    Chief Complaint: Left shoulder follow-up    HPI: Deirdre Perez is a 74 y.o. female patient who turns today for her left shoulder.  No pain.  She is doing well working with therapy continues to improve.    Past Medical History:   Diagnosis Date    Acid reflux     Asthma     GERD (gastroesophageal reflux disease)     Hypertension     Hypothyroid     Thyroid disease        Family History   Problem Relation Age of Onset    Hypertension Daughter     Diabetes Sister     Heart Disease Sister     Hypertension Son     Colon Cancer Father     Prostate Cancer Father     Stroke Father     Diabetes Father     Hypertension Mother     Heart Disease Mother     Diabetes Mother     Osteoporosis Mother     Hypertension Father     Heart Disease Father        Current Outpatient Medications   Medication Sig Dispense Refill    aspirin 325 MG EC tablet Take 1 tablet by mouth in the morning and at bedtime 30 tablet 3    ferrous sulfate (IRON 325) 325 (65 Fe) MG tablet Take 1 tablet by mouth daily (with breakfast)      amLODIPine-benazepril (LOTREL) 10-20 MG per capsule Take 1 capsule by mouth      bisoprolol-hydroCHLOROthiazide (ZIAC) 10-6.25 MG per tablet Take 1 tablet by mouth daily      diphenhydrAMINE (BENADRYL) 25 MG capsule Take 1 capsule by mouth      ergocalciferol (ERGOCALCIFEROL) 1.25 MG (95089 UT) capsule Take 1 capsule by mouth      levothyroxine (SYNTHROID) 100 MCG tablet Take 1 tablet by mouth every other day      levothyroxine (SYNTHROID) 112 MCG tablet Take 1 tablet by mouth every other day      omeprazole (PRILOSEC) 20 MG delayed release capsule Take 1 capsule by mouth daily      oxyCODONE-acetaminophen (PERCOCET) 5-325 MG per tablet 1-2

## 2024-12-23 ENCOUNTER — HOSPITAL ENCOUNTER (OUTPATIENT)
Facility: HOSPITAL | Age: 74
Setting detail: RECURRING SERIES
Discharge: HOME | End: 2024-12-26
Payer: MEDICARE

## 2024-12-23 PROCEDURE — 97112 NEUROMUSCULAR REEDUCATION: CPT

## 2024-12-23 PROCEDURE — 97110 THERAPEUTIC EXERCISES: CPT

## 2024-12-23 NOTE — PROGRESS NOTES
4+   Shoulder Ext 4- 4+   Shoulder ABD 3 4+   Shoulder ADD 4- 4+   Shoulder IR 4- 4+   Shoulder ER 4- 4+`      Pt will have painfree left shoulder PROM WFL to aid in functional mechanics for ADLs.  Eval Status:   Left Right   100 WNL   50 WNL   80 WNL   30 WNL   50 WNL    PN: PROM Left shoulder flexion 120 degrees.      Long Term Goals: To be accomplished in 24 treatments:  Patient will improve DASH score by 18 points to improve functional tolerance for ADLs and gardening activities.  Eval Status: DASH: 38.6%  PN (11/20/2024): regressed to 40.9%.      Pt will have painfree left shoulder AROM WFL to aid in functional mechanics for ADLs.  Eval Status:   Shoulder Left Right   Flexion 35 150   Extension 30 60   ABD 50 160   ER 45 80   IR Reaches to back left pocket 70    PN:11/20/2024  Shoulder Left Right   Flexion 80 150   Extension 30 60   ABD 70 160   ER 45 80   IR Reaches to back left pocket 70      PN: Left shoulder AROM 70 degrees, RONALDO to occiput, FIR to sacral level     Pt will have 4+/5 or greater left shoulder strength to return to goals of reaching overhead and carrying items in home.  Eval Status:   Shoulder Left (1-5) Right (1-5)   Shoulder Flexion 3 4+   Shoulder Ext 4- 4+   Shoulder ABD 3 4+   Shoulder ADD 4- 4+   Shoulder IR 4- 4+   Shoulder ER 4- 4+          PN:   Shoulder Left (1-5) Right (1-5)   Shoulder Flexion 3 4+   Shoulder Ext 4 4+   Shoulder ABD 3 4+   Shoulder ADD 4 4+   Shoulder IR 4 4+   Shoulder ER 4 4+      Patient will improve pain in lft to 0/10 at worst to improve daily activities, recreational activity tolerance, and restore prior level of function.  Eval Status: 4/10 at worst  PN: MET \"No pain, sore\"    PLAN  Yes  Continue plan of care  []  Upgrade activities as tolerated  []  Discharge due to :  []  Other:    Elliott Duncan PTA    12/23/2024    1:19 PM    Future Appointments   Date Time Provider Department Center   12/27/2024  1:10 PM Reynaldo Jc, PT [facility]

## 2024-12-27 ENCOUNTER — HOSPITAL ENCOUNTER (OUTPATIENT)
Facility: HOSPITAL | Age: 74
Setting detail: RECURRING SERIES
Discharge: HOME | End: 2024-12-30
Payer: MEDICARE

## 2024-12-27 PROCEDURE — 97112 NEUROMUSCULAR REEDUCATION: CPT

## 2024-12-27 PROCEDURE — 97110 THERAPEUTIC EXERCISES: CPT

## 2024-12-27 NOTE — PROGRESS NOTES
PHYSICAL / OCCUPATIONAL THERAPY - DAILY TREATMENT NOTE     Patient Name: Deirdre Perez    Date: 2024    : 1950  Insurance: Payor: MEDICARE / Plan: MEDICARE PART A AND B / Product Type: *No Product type* /      Patient  verified Yes     Visit #   Current / Total 16 24   Time   In / Out 1:16 1:52   Pain   In / Out 0/10 0/10   Subjective Functional Status/Changes: The patient reports that she is getting better slowly.   Changes to:  Allergies, Med Hx, Sx Hx?   no       TREATMENT AREA =  Left shoulder pain [M25.512]    If an interpreting service is utilized for treatment of this patient, the contents of this document represent the material reviewed with the patient via the .     OBJECTIVE  Therapeutic Procedures:  Tx Min Billable or 1:1 Min (if diff from Tx Min) Procedure, Rationale, Specifics   16  06803 Therapeutic Exercise (timed):  increase ROM, strength, coordination, balance, and proprioception to improve patient's ability to progress to PLOF and address remaining functional goals. (see flow sheet as applicable)    Details if applicable:       12  38821 Neuromuscular Re-Education (timed):  improve balance, coordination, kinesthetic sense, posture, core stability and proprioception to improve patient's ability to develop conscious control of individual muscles and awareness of position of extremities in order to progress to PLOF and address remaining functional goals. (see flow sheet as applicable)    Details if applicable:     8  92234 Manual Therapy (timed):  decrease pain, increase ROM, and increase tissue extensibility to improve patient's ability to progress to PLOF and address remaining functional goals.  The manual therapy interventions were performed at a separate and distinct time from the therapeutic activities interventions . Details:        Details if applicable:     36  [facility] Totals Reminder: bill using total billable min of TIMED therapeutic procedures (example: do not

## 2024-12-30 ENCOUNTER — APPOINTMENT (OUTPATIENT)
Facility: HOSPITAL | Age: 74
End: 2024-12-30
Payer: MEDICARE

## 2025-01-02 ENCOUNTER — APPOINTMENT (OUTPATIENT)
Facility: HOSPITAL | Age: 75
End: 2025-01-02
Payer: MEDICARE

## 2025-01-15 ENCOUNTER — TELEPHONE (OUTPATIENT)
Facility: HOSPITAL | Age: 75
End: 2025-01-15

## 2025-01-24 ENCOUNTER — OFFICE VISIT (OUTPATIENT)
Age: 75
End: 2025-01-24
Payer: MEDICARE

## 2025-01-24 VITALS — HEIGHT: 64 IN | RESPIRATION RATE: 16 BRPM | BODY MASS INDEX: 34.67 KG/M2

## 2025-01-24 DIAGNOSIS — Z96.612 STATUS POST REVERSE TOTAL REPLACEMENT OF LEFT SHOULDER: Primary | ICD-10-CM

## 2025-01-24 PROCEDURE — 1036F TOBACCO NON-USER: CPT | Performed by: ORTHOPAEDIC SURGERY

## 2025-01-24 PROCEDURE — G8427 DOCREV CUR MEDS BY ELIG CLIN: HCPCS | Performed by: ORTHOPAEDIC SURGERY

## 2025-01-24 PROCEDURE — 1125F AMNT PAIN NOTED PAIN PRSNT: CPT | Performed by: ORTHOPAEDIC SURGERY

## 2025-01-24 PROCEDURE — 1123F ACP DISCUSS/DSCN MKR DOCD: CPT | Performed by: ORTHOPAEDIC SURGERY

## 2025-01-24 PROCEDURE — 99213 OFFICE O/P EST LOW 20 MIN: CPT | Performed by: ORTHOPAEDIC SURGERY

## 2025-01-24 PROCEDURE — 1159F MED LIST DOCD IN RCRD: CPT | Performed by: ORTHOPAEDIC SURGERY

## 2025-01-24 PROCEDURE — 1090F PRES/ABSN URINE INCON ASSESS: CPT | Performed by: ORTHOPAEDIC SURGERY

## 2025-01-24 PROCEDURE — 1160F RVW MEDS BY RX/DR IN RCRD: CPT | Performed by: ORTHOPAEDIC SURGERY

## 2025-01-24 PROCEDURE — 3017F COLORECTAL CA SCREEN DOC REV: CPT | Performed by: ORTHOPAEDIC SURGERY

## 2025-01-24 PROCEDURE — G8417 CALC BMI ABV UP PARAM F/U: HCPCS | Performed by: ORTHOPAEDIC SURGERY

## 2025-01-24 PROCEDURE — G8399 PT W/DXA RESULTS DOCUMENT: HCPCS | Performed by: ORTHOPAEDIC SURGERY

## 2025-01-24 NOTE — PROGRESS NOTES
[facility] AMBULATORY OFFICE NOTE    Patient: Deirdre Perez                MRN: [id number]       SSN: [id number]  YOB: 1950        AGE: 74 y.o.        SEX: female  Body mass index is 34.67 kg/m².    PCP: Franco Pugh MD  01/24/25    Chief Complaint: Left shoulder follow-up    HPI: Deirdre Perez is a 74 y.o. female patient who returns today for her left shoulder.  5 months postop.  She missed physical therapy recently due to the flu and stomach virus.    Past Medical History:   Diagnosis Date    Acid reflux     Asthma     GERD (gastroesophageal reflux disease)     Hypertension     Hypothyroid     Thyroid disease        Family History   Problem Relation Age of Onset    Hypertension Daughter     Diabetes Sister     Heart Disease Sister     Hypertension Son     Colon Cancer Father     Prostate Cancer Father     Stroke Father     Diabetes Father     Hypertension Mother     Heart Disease Mother     Diabetes Mother     Osteoporosis Mother     Hypertension Father     Heart Disease Father        Current Outpatient Medications   Medication Sig Dispense Refill    aspirin 325 MG EC tablet Take 1 tablet by mouth in the morning and at bedtime 30 tablet 3    ferrous sulfate (IRON 325) 325 (65 Fe) MG tablet Take 1 tablet by mouth daily (with breakfast)      amLODIPine-benazepril (LOTREL) 10-20 MG per capsule Take 1 capsule by mouth      bisoprolol-hydroCHLOROthiazide (ZIAC) 10-6.25 MG per tablet Take 1 tablet by mouth daily      carbonyl iron (FEOSOL) 45 MG TABS Take 1 tablet by mouth daily (Patient not taking: Reported on 8/16/2024)      diphenhydrAMINE (BENADRYL) 25 MG capsule Take 1 capsule by mouth      ergocalciferol (ERGOCALCIFEROL) 1.25 MG (11857 UT) capsule Take 1 capsule by mouth      levothyroxine (SYNTHROID) 100 MCG tablet Take 1 tablet by mouth every other day      levothyroxine (SYNTHROID) 112 MCG tablet Take 1 tablet by mouth every other day      omeprazole

## 2025-02-12 ENCOUNTER — HOSPITAL ENCOUNTER (OUTPATIENT)
Facility: HOSPITAL | Age: 75
Setting detail: RECURRING SERIES
Discharge: HOME | End: 2025-02-15
Payer: MEDICARE

## 2025-02-12 PROCEDURE — 97535 SELF CARE MNGMENT TRAINING: CPT

## 2025-02-12 PROCEDURE — 97110 THERAPEUTIC EXERCISES: CPT

## 2025-02-12 PROCEDURE — 97161 PT EVAL LOW COMPLEX 20 MIN: CPT

## 2025-02-12 NOTE — PROGRESS NOTES
PHYSICAL / OCCUPATIONAL THERAPY - DAILY TREATMENT NOTE     Patient Name: Deirdre Perez    Date: 2025    : 1950  Insurance: Payor: MEDICARE / Plan: MEDICARE PART A AND B / Product Type: *No Product type* /      Patient  verified Yes     Visit #   Current / Total 1 24   Time   In / Out 2:00 2:40   Pain   In / Out 0 0   Subjective Functional Status/Changes: The pt presents with reports of limited ROM, some pain and limited use of left UE   Changes to:  Allergies, Med Hx, Sx Hx?   no       TREATMENT AREA =  Left shoulder pain [M25.512]    If an interpreting service is utilized for treatment of this patient, the contents of this document represent the material reviewed with the patient via the .     OBJECTIVE      Therapeutic Procedures:  Tx Min Billable or 1:1 Min (if diff from Tx Min) Procedure, Rationale, Specifics   15  08476 Therapeutic Exercise (timed):  increase ROM, strength, coordination, balance, and proprioception to improve patient's ability to progress to PLOF and address remaining functional goals. (see flow sheet as applicable)    Details if applicable:  HEP     10  09125 Self Care/Home Management (timed):  improve patient knowledge and understanding of home injury/symptom/pain management, activity modification, and joint protection strategies  to improve patient's ability to progress to PLOF and address remaining functional goals.  (see flow sheet as applicable)    Details if applicable:                    25  [facility] Totals Reminder: bill using total billable min of TIMED therapeutic procedures (example: do not include dry needle or estim unattended, both untimed codes, in totals to left)  8-22 min = 1 unit; 23-37 min = 2 units; 38-52 min = 3 units; 53-67 min = 4 units; 68-82 min = 5 units   Total Total     TOTAL TREATMENT TIME:        40     [x]  Patient Education billed concurrently with other procedures   [x] Review HEP    [] Progressed/Changed HEP, detail:    [] Other

## 2025-02-12 NOTE — PROGRESS NOTES
[facility]  [address] [facility] [address] [place] [address] Ph:[phone] Fx: [phone]    PLAN OF CARE/ Statement of Necessity for Physical Therapy Services           Patient name: Deirdre Perez Start of Care: 2025   Referral source: Alverto Qureshi MD : 1950    Medical Diagnosis: Left shoulder pain [M25.512]       Onset Date: 2024   Treatment Diagnosis: M25.512  LEFT SHOULDER PAIN                                     Prior Hospitalization: see medical history Provider#: [id number]     Comorbidities: Other: Left reverse TSA, OA, HTN    Prior Level of Function: functionally I with daily activities, left handed    The Plan of Care and following information is based on the information from the initial evaluation.    Assessment / key information:  73 y/o female presents to PT following reverse TSA in 2024. The pt was receiving PT following surgery and states she was progressing but was unable to continue with PT due to multiple illnesses. The pt feels she has regressed since discharge from PT. The pt demonstrates limited left shoulder AROM, PROM and strength in all planes of motion. The pt will benefit from PT to improve the aforementioned impairments to improve her functional use of the left UE.     Evaluation Complexity:  History:  MEDIUM  Complexity : 1-2 comorbidities / personal factors will impact the outcome/ POC ; Examination:  MEDIUM Complexity : 3 Standardized tests and measures addressin body structure, function, activity limitation and / or participation in recreation  ;Presentation:  LOW Complexity : Stable, uncomplicated  ;Clinical Decision Making:  QuickDASH: Disability Arm, Shoulder, Hand = 22.7 % ; (0% - 40% Normal to Mild Disability) = LOW Complexity FOTO score = an established functional score where 100 = no disability  Overall Complexity Rating: LOW       Problem List: pain affecting function, decrease ROM, decrease

## 2025-03-03 ENCOUNTER — HOSPITAL ENCOUNTER (OUTPATIENT)
Facility: HOSPITAL | Age: 75
Setting detail: RECURRING SERIES
Discharge: HOME | End: 2025-03-06
Payer: MEDICARE

## 2025-03-03 PROCEDURE — 97112 NEUROMUSCULAR REEDUCATION: CPT

## 2025-03-03 PROCEDURE — 97110 THERAPEUTIC EXERCISES: CPT

## 2025-03-03 NOTE — PROGRESS NOTES
:  []  Other:    Elliott Duncan PTA    3/3/2025    2:08 PM    Future Appointments   Date Time Provider Department Center   3/6/2025  1:50 PM Elliott Duncan, PTA [facility]   3/10/2025  1:50 PM Elliott Duncan, PTA [facility]   3/13/2025  1:50 PM Francisco J Curran, PT [facility]   3/17/2025  1:50 PM Elliott Duncan, PTA [facility]   3/20/2025  1:50 PM Francisco J Curran, PT [facility]   3/24/2025  1:50 PM Elliott Duncan, PTA [facility]   3/27/2025  1:50 PM Francisco J Curran, PT [facility]   3/31/2025  1:50 PM Elliott Duncan, PTA [facility]   4/3/2025  1:50 PM Francisco J Curran, PT [facility]

## 2025-03-06 ENCOUNTER — HOSPITAL ENCOUNTER (OUTPATIENT)
Facility: HOSPITAL | Age: 75
Setting detail: RECURRING SERIES
Discharge: HOME | End: 2025-03-09
Payer: MEDICARE

## 2025-03-06 PROCEDURE — 97110 THERAPEUTIC EXERCISES: CPT

## 2025-03-06 PROCEDURE — 97140 MANUAL THERAPY 1/> REGIONS: CPT

## 2025-03-06 PROCEDURE — 97112 NEUROMUSCULAR REEDUCATION: CPT

## 2025-03-06 NOTE — PROGRESS NOTES
therapeutic procedures (example: do not include dry needle or estim unattended, both untimed codes, in totals to left)  8-22 min = 1 unit; 23-37 min = 2 units; 38-52 min = 3 units; 53-67 min = 4 units; 68-82 min = 5 units   Total Total     TOTAL TREATMENT TIME:        38     [x]  Patient Education billed concurrently with other procedures   [x] Review HEP    [] Progressed/Changed HEP, detail:    [] Other detail:       Objective Information/Functional Measures/Assessment    Patient tolerated treatment session well today. Increased reps of mobility exercises this session. Pt was able to participate fully in treatment with no reports of increased pain or discomfort. Provided visual and verbal cues to correct form throughout session. Patient continues to make steady progress toward goals and would benefit from continued skilled PT intervention to address remaining deficits outlined in goals below.      Patient will continue to benefit from skilled PT / OT services to modify and progress therapeutic interventions, analyze and address functional mobility deficits, analyze and address ROM deficits, analyze and address strength deficits, analyze and address soft tissue restrictions, analyze and cue for proper movement patterns, analyze and modify for postural abnormalities, and instruct in home and community integration to address functional deficits and attain remaining goals.    Progress toward goals / Updated goals:  []  See Progress Note/Recertification    Short Term Goals: To be accomplished in 8 treatments  The pt will be I and compliant with HEP  IE- issued HEP   Current: Pt reports compliance. 3/3/2025  2. Improve left shoulder PROM flexion to 145 degrees for improved ability for functional tasks  IE- 135 degrees  Current: remains 135 deg. 3/6/2025  3. Improve left shoulder AROM flexion to 75 degrees for improved functional use of the left UE.               IE- 60 degrees with substitution   Long Term Goals: To be

## 2025-03-10 ENCOUNTER — HOSPITAL ENCOUNTER (OUTPATIENT)
Facility: HOSPITAL | Age: 75
Setting detail: RECURRING SERIES
Discharge: HOME | End: 2025-03-13
Payer: MEDICARE

## 2025-03-10 PROCEDURE — 97140 MANUAL THERAPY 1/> REGIONS: CPT

## 2025-03-10 PROCEDURE — 97112 NEUROMUSCULAR REEDUCATION: CPT

## 2025-03-10 PROCEDURE — 97110 THERAPEUTIC EXERCISES: CPT

## 2025-03-10 NOTE — PROGRESS NOTES
PHYSICAL / OCCUPATIONAL THERAPY - DAILY TREATMENT NOTE     Patient Name: Deirdre Perez    Date: 3/10/2025    : 1950  Insurance: Payor: MEDICARE / Plan: MEDICARE PART A AND B / Product Type: *No Product type* /      Patient  verified Yes     Visit #   Current / Total 4 24   Time   In / Out 1:51 2:30   Pain   In / Out 0/10 0/10   Subjective Functional Status/Changes: Pt reports no new complaints    Changes to:  Allergies, Med Hx, Sx Hx?   no       TREATMENT AREA =  Left shoulder pain [M25.512]    If an interpreting service is utilized for treatment of this patient, the contents of this document represent the material reviewed with the patient via the .     OBJECTIVE        Therapeutic Procedures:  Tx Min Billable or 1:1 Min (if diff from Tx Min) Procedure, Rationale, Specifics   21  31738 Therapeutic Exercise (timed):  increase ROM, strength, coordination, balance, and proprioception to improve patient's ability to progress to PLOF and address remaining functional goals. (see flow sheet as applicable)    Details if applicable:       10  75950 Neuromuscular Re-Education (timed):  improve balance, coordination, kinesthetic sense, posture, core stability and proprioception to improve patient's ability to develop conscious control of individual muscles and awareness of position of extremities in order to progress to PLOF and address remaining functional goals. (see flow sheet as applicable)    Details if applicable:     8  30034 Manual Therapy (timed):  decrease pain, increase ROM, and increase tissue extensibility to improve patient's ability to progress to PLOF and address remaining functional goals.  The manual therapy interventions were performed at a separate and distinct time from the therapeutic activities interventions . Details: GHJ distraction grade II-III, post /inf mobs grade II and neutral and end range flexion. PROM flexion      Details if applicable:     39  [facility] Totals Reminder:

## 2025-03-13 ENCOUNTER — HOSPITAL ENCOUNTER (OUTPATIENT)
Facility: HOSPITAL | Age: 75
Setting detail: RECURRING SERIES
Discharge: HOME | End: 2025-03-16
Payer: MEDICARE

## 2025-03-13 PROCEDURE — 97110 THERAPEUTIC EXERCISES: CPT

## 2025-03-13 PROCEDURE — 97112 NEUROMUSCULAR REEDUCATION: CPT

## 2025-03-13 PROCEDURE — 97140 MANUAL THERAPY 1/> REGIONS: CPT

## 2025-03-13 NOTE — PROGRESS NOTES
PHYSICAL / OCCUPATIONAL THERAPY - DAILY TREATMENT NOTE     Patient Name: Deirdre Perez    Date: 3/13/2025    : 1950  Insurance: Payor: MEDICARE / Plan: MEDICARE PART A AND B / Product Type: *No Product type* /      Patient  verified Yes     Visit #   Current / Total 5 24   Time   In / Out 1:52 2:37   Pain   In / Out 0 0   Subjective Functional Status/Changes: The pt reports improvement with PT. She reports some soreness but not too bad.    Changes to:  Allergies, Med Hx, Sx Hx?   no       TREATMENT AREA =  Left shoulder pain [M25.512]    If an interpreting service is utilized for treatment of this patient, the contents of this document represent the material reviewed with the patient via the .     OBJECTIVE      Therapeutic Procedures:  Tx Min Billable or 1:1 Min (if diff from Tx Min) Procedure, Rationale, Specifics   25  71032 Therapeutic Exercise (timed):  increase ROM, strength, coordination, balance, and proprioception to improve patient's ability to progress to PLOF and address remaining functional goals. (see flow sheet as applicable)    Details if applicable:       12  70152 Neuromuscular Re-Education (timed):  improve balance, coordination, kinesthetic sense, posture, core stability and proprioception to improve patient's ability to develop conscious control of individual muscles and awareness of position of extremities in order to progress to PLOF and address remaining functional goals. (see flow sheet as applicable)    Details if applicable:     8  81477 Manual Therapy (timed):  increase ROM and increase tissue extensibility to improve patient's ability to progress to PLOF and address remaining functional goals.  The manual therapy interventions were performed at a separate and distinct time from the therapeutic activities interventions . Details: Pt supine performed grade II/III post glides, PROM of the left shoulder.       Details if applicable:               45  [facility] Totals 
activities              IE- 2+/5               PN: 2+/5 on 3/13/25       Summary of Care/ Key Functional Changes: The pt demonstrates improved Quick Dash score, indicating improved ability for functional activities. PROM has progressed and some improvement is noted with AROM. The pt does remain very limited with AROM elevation and will benefit from continuation.       ASSESSMENT/RECOMMENDATIONS: Continue PT to further left shoulder ROM and strength.   Patient would benefit from the continuation of skilled rehab interventions for functional progress to achieving above stated clinically significant goals. Continue per plan of care.         Thank you for this referral.   Francisco J Curran, PT 3/13/2025 2:42 PM

## 2025-03-17 ENCOUNTER — HOSPITAL ENCOUNTER (OUTPATIENT)
Facility: HOSPITAL | Age: 75
Setting detail: RECURRING SERIES
Discharge: HOME | End: 2025-03-20
Payer: MEDICARE

## 2025-03-17 PROCEDURE — 97112 NEUROMUSCULAR REEDUCATION: CPT

## 2025-03-17 PROCEDURE — 97110 THERAPEUTIC EXERCISES: CPT

## 2025-03-17 PROCEDURE — 97140 MANUAL THERAPY 1/> REGIONS: CPT

## 2025-03-17 NOTE — PROGRESS NOTES
PHYSICAL / OCCUPATIONAL THERAPY - DAILY TREATMENT NOTE     Patient Name: Deirdre Perez    Date: 3/17/2025    : 1950  Insurance: Payor: MEDICARE / Plan: MEDICARE PART A AND B / Product Type: *No Product type* /      Patient  verified Yes     Visit #   Current / Total 6 24   Time   In / Out 1:52 2:32   Pain   In / Out 0/10 0/10   Subjective Functional Status/Changes: Pt reports doing good. \"I've been doing exercises everyday\"   Changes to:  Allergies, Med Hx, Sx Hx?   no       TREATMENT AREA =  Left shoulder pain [M25.512]    If an interpreting service is utilized for treatment of this patient, the contents of this document represent the material reviewed with the patient via the .     OBJECTIVE    M  Therapeutic Procedures:  Tx Min Billable or 1:1 Min (if diff from Tx Min) Procedure, Rationale, Specifics   20  40009 Therapeutic Exercise (timed):  increase ROM, strength, coordination, balance, and proprioception to improve patient's ability to progress to PLOF and address remaining functional goals. (see flow sheet as applicable)    Details if applicable:       12  79649 Neuromuscular Re-Education (timed):  improve balance, coordination, kinesthetic sense, posture, core stability and proprioception to improve patient's ability to develop conscious control of individual muscles and awareness of position of extremities in order to progress to PLOF and address remaining functional goals. (see flow sheet as applicable)    Details if applicable:     8  14036 Manual Therapy (timed):  decrease pain, increase ROM, increase tissue extensibility, and decrease trigger points to improve patient's ability to progress to PLOF and address remaining functional goals.  The manual therapy interventions were performed at a separate and distinct time from the therapeutic activities interventions . Details: STM and TPR to left rhomboid, scapular clocks, PROM ABD with scapular upward rotation, Pt right side lying

## 2025-03-20 ENCOUNTER — HOSPITAL ENCOUNTER (OUTPATIENT)
Facility: HOSPITAL | Age: 75
Setting detail: RECURRING SERIES
Discharge: HOME | End: 2025-03-23
Payer: MEDICARE

## 2025-03-20 PROCEDURE — 97140 MANUAL THERAPY 1/> REGIONS: CPT

## 2025-03-20 PROCEDURE — 97110 THERAPEUTIC EXERCISES: CPT

## 2025-03-20 PROCEDURE — 97112 NEUROMUSCULAR REEDUCATION: CPT

## 2025-03-20 NOTE — PROGRESS NOTES
min of TIMED therapeutic procedures (example: do not include dry needle or estim unattended, both untimed codes, in totals to left)  8-22 min = 1 unit; 23-37 min = 2 units; 38-52 min = 3 units; 53-67 min = 4 units; 68-82 min = 5 units   Total Total     TOTAL TREATMENT TIME:        45     Charge Capture    [x]  Patient Education billed concurrently with other procedures   [x] Review HEP    [] Progressed/Changed HEP, detail:    [] Other detail:       Objective Information/Functional Measures/Assessment    Increased weight with side lying abd and ER without complaint. The pt is progressing with strengthening but slowly.      Patient will continue to benefit from skilled PT / OT services to modify and progress therapeutic interventions, analyze and address functional mobility deficits, analyze and address ROM deficits, analyze and address strength deficits, analyze and address soft tissue restrictions, and analyze and cue for proper movement patterns to address functional deficits and attain remaining goals.    Progress toward goals / Updated goals:  []  See Progress Note/Recertification    Short Term Goals: To be accomplished in 8 treatments  The pt will be I and compliant with HEP  IE- issued HEP    PN: Pt reports compliance. 3/3/2025  2. Improve left shoulder PROM flexion to 145 degrees for improved ability for functional tasks  IE- 135 degrees   PN: Progressed to 140 degrees PROM on 3/13/25  3. Improve left shoulder AROM flexion to 75 degrees for improved functional use of the left UE.               IE- 60 degrees with substitution                PN:MET 75 degrees with substitution on 3/13/25  Long Term Goals: To be accomplished in 24 treatments  Improve Quick Dash score to 15% to improve ability for functional use of the left arm.   IE- 22.7%   PN: MET goal 13.6%  2. The pt will demonstrate 120 degrees of left shoulder AROM elevation to improve ability for ADLs .              IE- 60 degrees with substitution

## 2025-03-24 ENCOUNTER — HOSPITAL ENCOUNTER (OUTPATIENT)
Facility: HOSPITAL | Age: 75
Setting detail: RECURRING SERIES
Discharge: HOME | End: 2025-03-27
Payer: MEDICARE

## 2025-03-24 PROCEDURE — 97112 NEUROMUSCULAR REEDUCATION: CPT

## 2025-03-24 PROCEDURE — 97110 THERAPEUTIC EXERCISES: CPT

## 2025-03-24 PROCEDURE — 97140 MANUAL THERAPY 1/> REGIONS: CPT

## 2025-03-27 ENCOUNTER — TELEPHONE (OUTPATIENT)
Facility: HOSPITAL | Age: 75
End: 2025-03-27

## 2025-03-27 ENCOUNTER — APPOINTMENT (OUTPATIENT)
Facility: HOSPITAL | Age: 75
End: 2025-03-27
Payer: MEDICARE

## 2025-03-31 ENCOUNTER — HOSPITAL ENCOUNTER (OUTPATIENT)
Facility: HOSPITAL | Age: 75
Setting detail: RECURRING SERIES
Discharge: HOME | End: 2025-04-03
Payer: MEDICARE

## 2025-03-31 PROCEDURE — 97140 MANUAL THERAPY 1/> REGIONS: CPT

## 2025-03-31 PROCEDURE — 97112 NEUROMUSCULAR REEDUCATION: CPT

## 2025-03-31 PROCEDURE — 97110 THERAPEUTIC EXERCISES: CPT

## 2025-03-31 NOTE — PROGRESS NOTES
PHYSICAL / OCCUPATIONAL THERAPY - DAILY TREATMENT NOTE     Patient Name: Deirdre Perez    Date: 3/31/2025    : 1950  Insurance: Payor: MEDICARE / Plan: MEDICARE PART A AND B / Product Type: *No Product type* /      Patient  verified Yes     Visit #   Current / Total 9 24   Time   In / Out 1:57 2:37   Pain   In / Out 0/10 0/10   Subjective Functional Status/Changes: Pt reports doing good.   Changes to:  Allergies, Med Hx, Sx Hx?   no       TREATMENT AREA =  Left shoulder pain [M25.512]    If an interpreting service is utilized for treatment of this patient, the contents of this document represent the material reviewed with the patient via the .     OBJECTIVE      Therapeutic Procedures:  Tx Min Billable or 1:1 Min (if diff from Tx Min) Procedure, Rationale, Specifics   20  17714 Therapeutic Exercise (timed):  increase ROM, strength, coordination, balance, and proprioception to improve patient's ability to progress to PLOF and address remaining functional goals. (see flow sheet as applicable)    Details if applicable:       12  81514 Neuromuscular Re-Education (timed):  improve balance, coordination, kinesthetic sense, posture, core stability and proprioception to improve patient's ability to develop conscious control of individual muscles and awareness of position of extremities in order to progress to PLOF and address remaining functional goals. (see flow sheet as applicable)    Details if applicable:     8  36547 Manual Therapy (timed):  decrease pain, increase ROM, and increase tissue extensibility to improve patient's ability to progress to PLOF and address remaining functional goals.  The manual therapy interventions were performed at a separate and distinct time from the therapeutic activities interventions . Details: STM and TPR to left rhomboids and LS. GHJ distraction, post/inf grade II-III mob with movement into flexion       Details if applicable:     40  [facility] Totals Reminder: bill

## 2025-04-03 ENCOUNTER — HOSPITAL ENCOUNTER (OUTPATIENT)
Facility: HOSPITAL | Age: 75
Setting detail: RECURRING SERIES
Discharge: HOME | End: 2025-04-06
Payer: MEDICARE

## 2025-04-03 PROCEDURE — 97112 NEUROMUSCULAR REEDUCATION: CPT

## 2025-04-03 PROCEDURE — 97140 MANUAL THERAPY 1/> REGIONS: CPT

## 2025-04-03 PROCEDURE — 97110 THERAPEUTIC EXERCISES: CPT

## 2025-04-03 NOTE — PROGRESS NOTES
Physical Therapy Discharge Instructions      [facility]  [address]  [place] [address] [phone] fax    Patient: Deirdre Perez  : 1950      Continue Home Exercise Program 2 times per day for 3 weeks, then decrease to 3-5 times per week      Continue with    [x] Ice  as needed 2 times per day     [x] Heat           Follow up with MD:     [x] Upon completion of therapy     [] As needed      Recommendations:     [x]   Return to activity with home program    []   Return to activity with the following modifications:       []Post Rehab Program    []Join Independent aquatic program     []Return to/join local gym        Additional Comments:            Francisco J Curran, PT 4/3/2025 2:46 PM    
shoulder scaption strength to 3+/5 to improve ability for performing functional activities              IE- 2+/5               PN: 2+/5 on 3/13/25              Current: progressed to 3-/5 on 4/3/25    Assessment/ Summary of Care: The pt has made progress with PT but does remain limited with AROM. She is requesting discharge at this time due to having non related surgeries.     RECOMMENDATIONS:  [x]Discontinue therapy: [x]Patient has reached or is progressing toward set goals      []Patient is non-compliant or has abdicated      []Due to lack of appreciable progress towards set goals    Francisco J Curran, PT 4/3/2025 2:13 PM    
2+/5               PN: 2+/5 on 3/13/25              Current: progressed to 3-/5 on 4/3/25    PLAN     Continue plan of care  []  Upgrade activities as tolerated  [x]  Discharge due to : pt request due to cataract surgery   []  Other:    Francisco J Curran, PT    4/3/2025    2:01 PM    No future appointments.

## (undated) DEVICE — HANDPIECE SET WITH HIGH FLOW TIP AND SUCTION TUBE: Brand: INTERPULSE

## (undated) DEVICE — SUTURE VICRYL SZ 2-0 L36IN ABSRB UD L36MM CT-1 1/2 CIR J945H

## (undated) DEVICE — 1010 S-DRAPE TOWEL DRAPE 10/BX: Brand: STERI-DRAPE™

## (undated) DEVICE — STOCKINETTE ORTH W9XL36IN COT 2 PLY HLLW FOR HANDLING LMB

## (undated) DEVICE — BASE CALIB REUSE VIP 5D
Type: IMPLANTABLE DEVICE | Site: SHOULDER | Status: NON-FUNCTIONAL
Removed: 2024-08-26

## (undated) DEVICE — 3M™ TEGADERM™ TRANSPARENT FILM DRESSING FRAME STYLE, 1626W, 4 IN X 4-3/4 IN (10 CM X 12 CM), 50/CT 4CT/CASE: Brand: 3M™ TEGADERM™

## (undated) DEVICE — 3M™ TEGADERM™ TRANSPARENT FILM DRESSING FRAME STYLE, 1627, 4 IN X 10 IN (10 CM X 25 CM), 20/CT 4CT/CASE: Brand: 3M™ TEGADERM™

## (undated) DEVICE — SET PIN MOD GLEN SYS
Type: IMPLANTABLE DEVICE | Site: SHOULDER | Status: NON-FUNCTIONAL
Removed: 2024-08-26

## (undated) DEVICE — BANDAGE,GAUZE,BULKEE II,4.5"X4.1YD,STRL: Brand: MEDLINE

## (undated) DEVICE — 2108 SERIES SAGITTAL BLADE (24.8 X 1.24 X 80.1MM)

## (undated) DEVICE — PIN GLENOID DYNANITE VIP 2.8MM

## (undated) DEVICE — HOOD SURG W/ PEELWY LENS T7

## (undated) DEVICE — BIT DRILL 3.0

## (undated) DEVICE — STERILE POLYISOPRENE POWDER-FREE SURGICAL GLOVES: Brand: PROTEXIS

## (undated) DEVICE — GLOVE ORTHO 7 1/2   MSG9475

## (undated) DEVICE — BLANKET WRM W40.2XL55.9IN IORT LO BODY + MISTRAL AIR

## (undated) DEVICE — INTENDED FOR TISSUE SEPARATION, AND OTHER PROCEDURES THAT REQUIRE A SHARP SURGICAL BLADE TO PUNCTURE OR CUT.: Brand: BARD-PARKER ®  SAFETY SCALPED

## (undated) DEVICE — 4-PORT MANIFOLD: Brand: NEPTUNE 2

## (undated) DEVICE — GAUZE,SPONGE,4"X4",12PLY,STERILE,LF,2'S: Brand: MEDLINE

## (undated) DEVICE — KIT SUT SZ 2 L38IN FIBERWIRE W/ TAPR NDL STR NIT LOOP MIC

## (undated) DEVICE — DRAPE TWL SURG 16X26IN BLU ORB04] ALLCARE INC]

## (undated) DEVICE — SUTURE ETHIBOND NONABSORBABLE BRAIDED 2-0 CT-2 1X30 IN  EXCEL X411H

## (undated) DEVICE — KIT OR TURNOVER

## (undated) DEVICE — 3M™ STERI-DRAPE™ U-DRAPE 1015: Brand: STERI-DRAPE™

## (undated) DEVICE — SUTURE VICRYL SZ 0 L36IN ABSRB UD L36MM CT-1 1/2 CIR J946H

## (undated) DEVICE — SHOULDER STABILIZATION KIT,                                    DISPOSABLE 12 PER BOX

## (undated) DEVICE — LIGHT HANDLE: Brand: DEVON

## (undated) DEVICE — BANDAGE COBAN 4 IN COMPR W4INXL5YD FOAM COHESIVE QUIK STK SELF ADH SFT

## (undated) DEVICE — SOLUTION IRRIG 3000ML 0.9% SOD CHL FLX CONT 0797208] ICU MEDICAL INC]

## (undated) DEVICE — INTENDED FOR TISSUE SEPARATION, AND OTHER PROCEDURES THAT REQUIRE A SHARP SURGICAL BLADE TO PUNCTURE OR CUT.: Brand: BARD-PARKER SAFETY BLADES SIZE 10, STERILE

## (undated) DEVICE — SUTURE MONOCRYL SZ 3-0 L27IN ABSRB UD L24MM PS-1 3/8 CIR PRIM Y936H

## (undated) DEVICE — APPLICATOR MEDICATED 26 CC SOLUTION HI LT ORNG CHLORAPREP

## (undated) DEVICE — Device

## (undated) DEVICE — SOLUTION IRRIG 1000ML 0.9% SOD CHL USP POUR PLAS BTL

## (undated) DEVICE — BRACE SHLDR M FA L135 145IN UNIV AIRMESH BRTH SLNG 15DEG

## (undated) DEVICE — PACK PROCEDURE SURG TOT HIP BSHR LF

## (undated) DEVICE — LIQUIBAND RAPID ADHESIVE 36/CS 0.8ML: Brand: MEDLINE